# Patient Record
Sex: MALE | Race: WHITE | NOT HISPANIC OR LATINO | Employment: OTHER | URBAN - METROPOLITAN AREA
[De-identification: names, ages, dates, MRNs, and addresses within clinical notes are randomized per-mention and may not be internally consistent; named-entity substitution may affect disease eponyms.]

---

## 2023-08-07 ENCOUNTER — HOSPITAL ENCOUNTER (OUTPATIENT)
Dept: DATA CONVERSION | Facility: HOSPITAL | Age: 58
Discharge: ANOTHER HOSPITAL | End: 2023-08-07
Attending: EMERGENCY MEDICINE

## 2023-08-07 DIAGNOSIS — Z79.01 LONG TERM (CURRENT) USE OF ANTICOAGULANTS: ICD-10-CM

## 2023-08-07 DIAGNOSIS — S11.95XA OPEN BITE OF UNSPECIFIED PART OF NECK, INITIAL ENCOUNTER: ICD-10-CM
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
## 2023-08-07 DIAGNOSIS — Z86.718 PERSONAL HISTORY OF OTHER VENOUS THROMBOSIS AND EMBOLISM: ICD-10-CM

## 2023-08-07 DIAGNOSIS — Z79.899 OTHER LONG TERM (CURRENT) DRUG THERAPY: ICD-10-CM

## 2023-08-07 DIAGNOSIS — Z23 ENCOUNTER FOR IMMUNIZATION: ICD-10-CM

## 2023-08-07 DIAGNOSIS — Z20.822 CONTACT WITH AND (SUSPECTED) EXPOSURE TO COVID-19: ICD-10-CM

## 2023-08-07 LAB
ABO + RH BLD: NORMAL
AMPHETAMINES UR QL SCN>1000 NG/ML: NEGATIVE
ANION GAP SERPL CALCULATED.3IONS-SCNC: 12 MMOL/L (ref 0–19)
ANTICOAGULANT: ABNORMAL
APAP SERPL-MCNC: 5 UG/ML (ref 15–30)
BARBITURATES UR QL SCN>300 NG/ML: NEGATIVE
BASOPHILS # BLD AUTO: 0.11 K/UL (ref 0–0.22)
BASOPHILS NFR BLD AUTO: 1.5 % (ref 0–1)
BENZODIAZ UR QL SCN>300 NG/ML: NEGATIVE
BLD GP AB SCN SERPL QL: NEGATIVE
BLD PROD TYP BPU: NORMAL
BUN SERPL-MCNC: 11 MG/DL (ref 8–25)
BUN/CREAT SERPL: 11 RATIO (ref 8–21)
BZE UR QL SCN>300 NG/ML: NEGATIVE
CALCIUM SERPL-MCNC: 8.8 MG/DL (ref 8.5–10.4)
CANNABINOIDS UR QL SCN>50 NG/ML: NORMAL
CHLORIDE SERPL-SCNC: 104 MMOL/L (ref 97–107)
CO2 SERPL-SCNC: 22 MMOL/L (ref 24–31)
CREAT SERPL-MCNC: 1 MG/DL (ref 0.4–1.6)
DEPRECATED RDW RBC AUTO: 47.7 FL (ref 37–54)
DIFFERENTIAL METHOD BLD: ABNORMAL
DRUG SCREEN COMMENT UR-IMP: NORMAL
EOSINOPHIL # BLD AUTO: 0.15 K/UL (ref 0–0.45)
EOSINOPHIL NFR BLD: 2 % (ref 0–3)
ERYTHROCYTE [DISTWIDTH] IN BLOOD BY AUTOMATED COUNT: 14 % (ref 11.7–15)
ETHANOL SERPL-MCNC: <0.01 GM/DL (ref 0–0.01)
EUA DISCLAIMER: NORMAL
FENTANYL+NORFENTANYL UR QL SCN: NORMAL
FLUAV RNA NPH QL NAA+PROBE: NEGATIVE
FLUBV RNA NPH QL NAA+PROBE: NEGATIVE
GFR SERPL CREATININE-BSD FRML MDRD: 87 ML/MIN/1.73 M2
GLUCOSE SERPL-MCNC: 129 MG/DL (ref 65–99)
HCT VFR BLD AUTO: 41.5 % (ref 41–50)
HGB BLD-MCNC: 14.4 GM/DL (ref 13.5–16.5)
HX OF BLOOD TRANSFUSION: NORMAL
IMM GRANULOCYTES # BLD AUTO: 0.01 K/UL (ref 0–0.1)
INR PPP: 2.5 (ref 0.86–1.16)
LACTATE BLDV-SCNC: 1.7 MMOL/L (ref 0.4–2)
LITHIUM SERPL-SCNC: 0.1 MMOL/L (ref 0.6–1.2)
LYMPHOCYTES # BLD AUTO: 2.33 K/UL (ref 1.2–3.2)
LYMPHOCYTES NFR BLD MANUAL: 31.1 % (ref 20–40)
MCH RBC QN AUTO: 32.5 PG (ref 26–34)
MCHC RBC AUTO-ENTMCNC: 34.7 % (ref 31–37)
MCV RBC AUTO: 93.7 FL (ref 80–100)
METHADONE UR QL SCN>300 NG/ML: NEGATIVE
MONOCYTES # BLD AUTO: 0.64 K/UL (ref 0–0.8)
MONOCYTES NFR BLD MANUAL: 8.6 % (ref 0–8)
NEUTROPHILS # BLD AUTO: 4.24 K/UL
NEUTROPHILS # BLD AUTO: 4.24 K/UL (ref 1.8–7.7)
NEUTROPHILS.IMMATURE NFR BLD: 0.1 % (ref 0–1)
NEUTS SEG NFR BLD: 56.7 % (ref 50–70)
NRBC BLD-RTO: 0 /100 WBC
NUM BPU REQUESTED: 0
OPIATES UR QL SCN>300 NG/ML: NEGATIVE
OSMOL GAP SERPL: 293 MOS/KG (ref 285–295)
OXYCODONE UR QL: NEGATIVE
PCP UR QL SCN>25 NG/ML: NEGATIVE
PLATELET # BLD AUTO: 297 K/UL (ref 150–450)
PMV BLD AUTO: 10.8 CU (ref 7–12.6)
POTASSIUM SERPL-SCNC: 3.7 MMOL/L (ref 3.4–5.1)
PROTHROMBIN TIME: 25.1 SEC (ref 9.3–12.7)
RBC # BLD AUTO: 4.43 M/UL (ref 4.5–5.5)
SALICYLATES SERPL-MCNC: 0.3 MG/DL (ref 3–25)
SARS-COV-2 RNA SPEC QL NAA+PROBE: NEGATIVE
SODIUM SERPL-SCNC: 138 MMOL/L (ref 133–145)
SPECIMEN EXP DATE BLD: NORMAL
TEST ORDERED: NORMAL
TRANSF BAND NUM PATIENT: NORMAL
WBC # BLD AUTO: 7.5 K/UL (ref 4.5–11)

## 2023-08-08 ENCOUNTER — HOSPITAL ENCOUNTER (OUTPATIENT)
Dept: DATA CONVERSION | Facility: HOSPITAL | Age: 58
Setting detail: OBSERVATION
Discharge: HOME | End: 2023-08-11
Attending: EMERGENCY MEDICINE | Admitting: EMERGENCY MEDICINE
Payer: MEDICARE

## 2023-08-08 DIAGNOSIS — Z95.828 PRESENCE OF OTHER VASCULAR IMPLANTS AND GRAFTS: ICD-10-CM

## 2023-08-08 DIAGNOSIS — S11.90XA UNSPECIFIED OPEN WOUND OF UNSPECIFIED PART OF NECK, INITIAL ENCOUNTER: ICD-10-CM

## 2023-08-08 DIAGNOSIS — Z86.718 PERSONAL HISTORY OF OTHER VENOUS THROMBOSIS AND EMBOLISM: ICD-10-CM

## 2023-08-08 DIAGNOSIS — D69.3 IMMUNE THROMBOCYTOPENIC PURPURA (MULTI): ICD-10-CM

## 2023-08-08 DIAGNOSIS — D58.9 HEREDITARY HEMOLYTIC ANEMIA, UNSPECIFIED (CMS-HCC): ICD-10-CM

## 2023-08-08 DIAGNOSIS — Z79.01 LONG TERM (CURRENT) USE OF ANTICOAGULANTS: ICD-10-CM

## 2023-08-08 DIAGNOSIS — S11.95XA OPEN BITE OF UNSPECIFIED PART OF NECK, INITIAL ENCOUNTER: ICD-10-CM
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
## 2023-08-08 DIAGNOSIS — D69.41: ICD-10-CM

## 2023-08-08 DIAGNOSIS — D59.12 COLD AUTOIMMUNE HEMOLYTIC ANEMIA (MULTI): ICD-10-CM

## 2023-08-09 LAB
ACTIVATED PARTIAL THROMBOPLASTIN TIME IN PPP BY COAGULATION ASSAY: 35 SEC (ref 27–38)
ACTIVATED PARTIAL THROMBOPLASTIN TIME IN PPP BY COAGULATION ASSAY: 42 SEC (ref 27–38)
ALANINE AMINOTRANSFERASE (SGPT) (U/L) IN SER/PLAS: 9 U/L (ref 10–52)
ALBUMIN (G/DL) IN SER/PLAS: 4.1 G/DL (ref 3.4–5)
ALKALINE PHOSPHATASE (U/L) IN SER/PLAS: 65 U/L (ref 33–120)
ANION GAP IN SER/PLAS: 14 MMOL/L (ref 10–20)
ASPARTATE AMINOTRANSFERASE (SGOT) (U/L) IN SER/PLAS: 18 U/L (ref 9–39)
BASOPHILS (10*3/UL) IN BLOOD BY AUTOMATED COUNT: 0.07 X10E9/L (ref 0–0.1)
BASOPHILS/100 LEUKOCYTES IN BLOOD BY AUTOMATED COUNT: 0.5 % (ref 0–2)
BILIRUBIN TOTAL (MG/DL) IN SER/PLAS: 0.8 MG/DL (ref 0–1.2)
CALCIUM (MG/DL) IN SER/PLAS: 9.1 MG/DL (ref 8.6–10.6)
CARBON DIOXIDE, TOTAL (MMOL/L) IN SER/PLAS: 25 MMOL/L (ref 21–32)
CHLORIDE (MMOL/L) IN SER/PLAS: 105 MMOL/L (ref 98–107)
CREATININE (MG/DL) IN SER/PLAS: 1.16 MG/DL (ref 0.5–1.3)
EOSINOPHILS (10*3/UL) IN BLOOD BY AUTOMATED COUNT: 0.27 X10E9/L (ref 0–0.7)
EOSINOPHILS/100 LEUKOCYTES IN BLOOD BY AUTOMATED COUNT: 2 % (ref 0–6)
ERYTHROCYTE DISTRIBUTION WIDTH (RATIO) BY AUTOMATED COUNT: 14.4 % (ref 11.5–14.5)
ERYTHROCYTE MEAN CORPUSCULAR HEMOGLOBIN CONCENTRATION (G/DL) BY AUTOMATED: 34.6 G/DL (ref 32–36)
ERYTHROCYTE MEAN CORPUSCULAR VOLUME (FL) BY AUTOMATED COUNT: 95 FL (ref 80–100)
ERYTHROCYTES (10*6/UL) IN BLOOD BY AUTOMATED COUNT: 3.91 X10E12/L (ref 4.5–5.9)
GFR MALE: 72 ML/MIN/1.73M2
GLUCOSE (MG/DL) IN SER/PLAS: 94 MG/DL (ref 74–99)
HEMATOCRIT (%) IN BLOOD BY AUTOMATED COUNT: 37 % (ref 41–52)
HEMOGLOBIN (G/DL) IN BLOOD: 12.8 G/DL (ref 13.5–17.5)
IMMATURE GRANULOCYTES/100 LEUKOCYTES IN BLOOD BY AUTOMATED COUNT: 0.4 % (ref 0–0.9)
INR IN PPP BY COAGULATION ASSAY: 1.9 (ref 0.9–1.1)
INR IN PPP BY COAGULATION ASSAY: 2.5 (ref 0.9–1.1)
LEUKOCYTES (10*3/UL) IN BLOOD BY AUTOMATED COUNT: 13.8 X10E9/L (ref 4.4–11.3)
LYMPHOCYTES (10*3/UL) IN BLOOD BY AUTOMATED COUNT: 2.03 X10E9/L (ref 1.2–4.8)
LYMPHOCYTES/100 LEUKOCYTES IN BLOOD BY AUTOMATED COUNT: 14.7 % (ref 13–44)
MONOCYTES (10*3/UL) IN BLOOD BY AUTOMATED COUNT: 1.3 X10E9/L (ref 0.1–1)
MONOCYTES/100 LEUKOCYTES IN BLOOD BY AUTOMATED COUNT: 9.4 % (ref 2–10)
NEUTROPHILS (10*3/UL) IN BLOOD BY AUTOMATED COUNT: 10.04 X10E9/L (ref 1.2–7.7)
NEUTROPHILS/100 LEUKOCYTES IN BLOOD BY AUTOMATED COUNT: 73 % (ref 40–80)
NRBC (PER 100 WBCS) BY AUTOMATED COUNT: 0 /100 WBC (ref 0–0)
PLATELETS (10*3/UL) IN BLOOD AUTOMATED COUNT: 289 X10E9/L (ref 150–450)
POTASSIUM (MMOL/L) IN SER/PLAS: 5.2 MMOL/L (ref 3.5–5.3)
PROTEIN TOTAL: 6.1 G/DL (ref 6.4–8.2)
PROTHROMBIN TIME (PT) IN PPP BY COAGULATION ASSAY: 21.7 SEC (ref 9.8–12.8)
PROTHROMBIN TIME (PT) IN PPP BY COAGULATION ASSAY: 27.9 SEC (ref 9.8–12.8)
SODIUM (MMOL/L) IN SER/PLAS: 139 MMOL/L (ref 136–145)
UREA NITROGEN (MG/DL) IN SER/PLAS: 13 MG/DL (ref 6–23)

## 2023-08-10 LAB
ABO GROUP (TYPE) IN BLOOD: NORMAL
ACTIVATED PARTIAL THROMBOPLASTIN TIME IN PPP BY COAGULATION ASSAY: 30 SEC (ref 27–38)
ANTIBODY SCREEN: NORMAL
INR IN PPP BY COAGULATION ASSAY: 1.5 (ref 0.9–1.1)
PROTHROMBIN TIME (PT) IN PPP BY COAGULATION ASSAY: 17 SEC (ref 9.8–12.8)
RH FACTOR: NORMAL

## 2023-09-15 PROBLEM — B58.01: Status: ACTIVE | Noted: 2023-09-15

## 2023-09-15 PROBLEM — N52.9 ED (ERECTILE DYSFUNCTION): Status: ACTIVE | Noted: 2023-09-15

## 2023-09-15 PROBLEM — B02.30 HERPES ZOSTER WITH OPHTHALMIC COMPLICATION: Status: ACTIVE | Noted: 2023-09-15

## 2023-09-15 PROBLEM — F41.8 DEPRESSION WITH ANXIETY: Status: ACTIVE | Noted: 2023-09-15

## 2023-09-15 PROBLEM — B02.9 SHINGLES: Status: ACTIVE | Noted: 2023-09-15

## 2023-09-15 PROBLEM — I42.9 CARDIOMYOPATHY (MULTI): Status: ACTIVE | Noted: 2023-09-15

## 2023-09-15 PROBLEM — I82.90 VENOUS THROMBOSIS: Status: ACTIVE | Noted: 2023-09-15

## 2023-09-15 PROBLEM — H25.13 NUCLEAR SCLEROSIS OF BOTH EYES: Status: ACTIVE | Noted: 2023-09-15

## 2023-09-15 PROBLEM — R53.83 FATIGUE: Status: ACTIVE | Noted: 2023-09-15

## 2023-09-15 PROBLEM — G47.01 INSOMNIA DUE TO MEDICAL CONDITION: Status: ACTIVE | Noted: 2023-09-15

## 2023-09-15 PROBLEM — R94.30 CARDIAC LV EJECTION FRACTION 21-40%: Status: ACTIVE | Noted: 2023-09-15

## 2023-09-15 PROBLEM — R93.1 CARDIAC LV EJECTION FRACTION 21-40%: Status: ACTIVE | Noted: 2023-09-15

## 2023-09-15 PROBLEM — H53.8 BLURRED VISION, RIGHT EYE: Status: ACTIVE | Noted: 2023-09-15

## 2023-09-15 PROBLEM — M47.816 LUMBAR SPONDYLOSIS: Status: ACTIVE | Noted: 2023-09-15

## 2023-09-15 PROBLEM — N13.8 BPH WITH OBSTRUCTION/LOWER URINARY TRACT SYMPTOMS: Status: ACTIVE | Noted: 2023-09-15

## 2023-09-15 PROBLEM — M79.7 FIBROMYALGIA: Status: ACTIVE | Noted: 2023-09-15

## 2023-09-15 PROBLEM — H53.10 DISTURBANCE, VISUAL, SUBJECTIVE: Status: ACTIVE | Noted: 2023-09-15

## 2023-09-15 PROBLEM — H43.89 VITRITIS OF RIGHT EYE: Status: ACTIVE | Noted: 2023-09-15

## 2023-09-15 PROBLEM — N40.1 BPH WITH OBSTRUCTION/LOWER URINARY TRACT SYMPTOMS: Status: ACTIVE | Noted: 2023-09-15

## 2023-09-15 PROBLEM — E87.8 FLUID VOLUME DISORDER: Status: ACTIVE | Noted: 2023-09-15

## 2023-09-15 PROBLEM — E55.9 VITAMIN D INSUFFICIENCY: Status: ACTIVE | Noted: 2023-09-15

## 2023-09-15 PROBLEM — H90.3 SENSORINEURAL HEARING LOSS OF BOTH EARS: Status: ACTIVE | Noted: 2023-09-15

## 2023-09-15 PROBLEM — H25.13 AGE-RELATED NUCLEAR CATARACT OF BOTH EYES: Status: ACTIVE | Noted: 2023-09-15

## 2023-09-15 PROBLEM — R20.2 PARESTHESIA OF SKIN: Status: ACTIVE | Noted: 2023-09-15

## 2023-09-15 PROBLEM — H30.91: Status: ACTIVE | Noted: 2023-09-15

## 2023-09-15 PROBLEM — E87.6 HYPOKALEMIA: Status: ACTIVE | Noted: 2023-09-15

## 2023-09-15 PROBLEM — B37.89: Status: ACTIVE | Noted: 2023-09-15

## 2023-09-15 PROBLEM — H93.13 TINNITUS OF BOTH EARS: Status: ACTIVE | Noted: 2023-09-15

## 2023-09-15 PROBLEM — D69.41: Status: ACTIVE | Noted: 2023-09-15

## 2023-09-15 PROBLEM — H90.12 CONDUCTIVE HEARING LOSS OF LEFT EAR: Status: ACTIVE | Noted: 2023-09-15

## 2023-09-15 PROBLEM — H10.45 CHRONIC ALLERGIC CONJUNCTIVITIS: Status: ACTIVE | Noted: 2023-09-15

## 2023-09-15 PROBLEM — H40.051 OCULAR HYPERTENSION OF RIGHT EYE: Status: ACTIVE | Noted: 2023-09-15

## 2023-09-15 PROBLEM — J30.9 ALLERGIC RHINITIS: Status: ACTIVE | Noted: 2023-09-15

## 2023-09-15 PROBLEM — H43.11 VITREOUS HEMORRHAGE, RIGHT EYE (MULTI): Status: ACTIVE | Noted: 2023-09-15

## 2023-09-15 RX ORDER — VORICONAZOLE 200 MG/1
1 TABLET, FILM COATED ORAL EVERY 12 HOURS
COMMUNITY
Start: 2022-01-11

## 2023-09-15 RX ORDER — LORAZEPAM 1 MG/1
TABLET ORAL
COMMUNITY
Start: 2017-09-21

## 2023-09-15 RX ORDER — TIMOLOL MALEATE 5 MG/ML
1 SOLUTION/ DROPS OPHTHALMIC 2 TIMES DAILY
COMMUNITY

## 2023-09-15 RX ORDER — CITALOPRAM 40 MG/1
1 TABLET, FILM COATED ORAL DAILY
COMMUNITY
Start: 2021-12-14

## 2023-09-15 RX ORDER — PANTOPRAZOLE SODIUM 40 MG/1
1 TABLET, DELAYED RELEASE ORAL DAILY
COMMUNITY
Start: 2021-12-14

## 2023-09-15 RX ORDER — OMEPRAZOLE 20 MG/1
1 CAPSULE, DELAYED RELEASE ORAL EVERY 24 HOURS
COMMUNITY

## 2023-09-15 RX ORDER — OXYCODONE HYDROCHLORIDE 5 MG/1
TABLET ORAL
COMMUNITY
Start: 2021-12-14

## 2023-09-15 RX ORDER — RITUXIMAB 10 MG/ML
INJECTION, SOLUTION INTRAVENOUS
COMMUNITY
Start: 2019-01-22 | End: 2023-11-25 | Stop reason: WASHOUT

## 2023-09-15 RX ORDER — CITALOPRAM 20 MG/1
40 TABLET, FILM COATED ORAL DAILY
COMMUNITY

## 2023-09-15 RX ORDER — MINERAL OIL
1 ENEMA (ML) RECTAL
COMMUNITY
Start: 2021-02-10

## 2023-09-15 RX ORDER — PREGABALIN 100 MG/1
CAPSULE ORAL
COMMUNITY

## 2023-09-15 RX ORDER — PREDNISOLONE ACETATE 10 MG/ML
SUSPENSION/ DROPS OPHTHALMIC
COMMUNITY
Start: 2022-01-11 | End: 2023-11-28 | Stop reason: SDUPTHER

## 2023-09-15 RX ORDER — TRAMADOL HYDROCHLORIDE 50 MG/1
TABLET ORAL
COMMUNITY

## 2023-09-15 RX ORDER — VALACYCLOVIR HYDROCHLORIDE 1 G/1
1 TABLET, FILM COATED ORAL 3 TIMES DAILY
COMMUNITY
Start: 2022-01-11

## 2023-09-15 RX ORDER — SULFAMETHOXAZOLE AND TRIMETHOPRIM 800; 160 MG/1; MG/1
1 TABLET ORAL 2 TIMES DAILY
COMMUNITY
Start: 2022-01-11

## 2023-09-15 RX ORDER — CHLORHEXIDINE GLUCONATE ORAL RINSE 1.2 MG/ML
SOLUTION DENTAL
COMMUNITY
Start: 2021-10-06

## 2023-09-15 RX ORDER — PREDNISONE 5 MG/1
TABLET ORAL
COMMUNITY
Start: 2021-12-14

## 2023-09-15 RX ORDER — TOPIRAMATE 25 MG/1
TABLET ORAL
COMMUNITY
End: 2023-11-25 | Stop reason: ALTCHOICE

## 2023-09-15 RX ORDER — WARFARIN 10 MG/1
20 TABLET ORAL DAILY
COMMUNITY

## 2023-09-15 RX ORDER — TEMAZEPAM 30 MG/1
CAPSULE ORAL
COMMUNITY

## 2023-09-15 RX ORDER — WARFARIN 7.5 MG/1
TABLET ORAL
COMMUNITY
Start: 2021-12-14

## 2023-09-29 VITALS
RESPIRATION RATE: 16 BRPM | TEMPERATURE: 97.5 F | BODY MASS INDEX: 21.67 KG/M2 | HEIGHT: 65 IN | DIASTOLIC BLOOD PRESSURE: 84 MMHG | OXYGEN SATURATION: 98 % | SYSTOLIC BLOOD PRESSURE: 146 MMHG | HEART RATE: 66 BPM | WEIGHT: 130.07 LBS

## 2023-09-30 NOTE — H&P
"    History of Present Illness:   HPI:    DONNA WHITTAKER is a 57 year old Male with a PMHx of Flores syndrome, acute ITP, BLE DVTs (for which he has an IVC filter and is on warfarin 10mg daily), and hemolytic  anemia who presents to the ED s/p dog bite to the right side of the neck on the evening of 8/7.  Patient was seen as a trauma consult and this ED yesterday and was discharged home after hemostasis of the wound was achieved with silver nitrate cauterization,  several loose sutures, and Surgicel packing.  He presents again with continued bleeding, swelling, and pain to the right side of the neck.  Patient states that he did not take his warfarin today. No other complaints at this time.     Per HPI from the evening of 8/7/23:   Pt arrives \"...as a trauma consult s/p dog bite to the right side of the neck.  Per patient, his dogs were agitated at the sound of fire trucks outside, so the patient got between them to calm them down, at which  time his 200lb English Mastiff jumped up and bit the right side of his neck.  Patient reports that he did not think it was a large wound until he saw the amount of blood loss.  Patient stepped outside and showed the firemen, at which time he was transported  to [facility].  Patient denies falling, dizziness, difficulty breathing, difficulty swallowing, numbness, nor any further injuries.  He does note a throbbing pain to the right side of the neck.  There is a 0.5 cm puncture wound to the right cheek  as well, where the patient states that the dog's tooth hit.  Patient reports that his dogs are all up-to-date on vaccinations.  At the OSH, patient received a tetanus booster, 3g amp/sulbactam, and 2g cefazolin.  CTA neck was performed which did not show  any vascular injury.  Hgb was 14.4, WBC 7.5, INR 2.5 (goal 2.5-3.0). Patient was transferred for further wound evaluation and treatment including possible surgical washout.  Upon arrival, patient complains continues to " "complain of right-sided neck pain,  but denies any other symptoms.  Wound with mild oozing, otherwise hemostatic.\"    PMHx: Flores syndrome, acute ITP, cold autoimmune hemolytic anemia, BLE DVT (with IVC filter and warfarin), anxiety, MDD    PSHx: Splenectomy, cholecystectomy, fistula repair    FHx: Noncontributory    Allergies: Heparin (rash)    Medications: Warfarin 10 mg daily, otherwise per EMR    Mechanism of Injury:  ·  Mechanism of Injury penetrating     Method of Arrival:  ·  Method of Arrival private vehicle     Primary Survey:   Airway Assessment:  ·  Airway intact     Breathing:  ·  Breathing equal bilateral breath sounds   ·  Breathing Left Side clear   ·  Breathing Right Side clear     Pulses:  ·  Radial Pulse Left 2+ (normal)   ·  Radial Pulse Right 2+ (normal)     Disability:  ·  Disability awake, neurologically intact     Corbin T Coma Scale:    Corbin T Coma Scale:   Best Eye Response: (E4) spontaneous   Best Motor Response: (M6) obeys commands   Best Verbal Response: (V5) oriented   Columbia Score: 15     Secondary Survey:   Physical Exam Narrative:  ·  Physical Exam:    Constitutional: Well developed, awake/alert/oriented  x3, no distress, alert and cooperative   Eyes: PERRL, EOMI, clear sclera, wearing  glasses   ENMT: mucous membranes moist   Head/Neck: NC; 0.5cm puncture wound to right  cheek with small area of underlying edema which is tender to touch; 4x3cm irregular avulsion injury to right side of neck with 2x2cm tissue flap which is sutured loosely in place, mild oozing of blood from superior aspect, no exposed muscle nor vessels,  mild underlying edema which is improved from yesterday, very tender to touch   Respiratory/Thorax: Patent airways, CTAB,  normal breath sounds with good chest expansion, thorax symmetric, no stridor   Cardiovascular: RRR, warm   Gastrointestinal: Nondistended, soft, non-tender   Musculoskeletal: ROM intact, no joint swelling,  normal strength   Extremities: " normal extremities, no cyanosis  edema, contusions or wounds, no clubbing   Neurological: alert and oriented x3, intact  senses, motor, response and reflexes, normal strength   Psychological: Appropriate mood and behaviour   Skin: Warm and dry, see head/neck           Social History:   ·  Smoking Status never smoker (1)   ·  Alcohol Use denies(1)   ·  Drug Use denies (1)       Allergies:       Allergies:  ·  heparin : Rash      Home Medications:  * Patient Currently Takes Medications as of 08-Aug-2023 09:53 documented in Structured Notes  ·   amoxicillin-clavulanate 875 mg-125 mg oral tablet: Schedule: 0, 1 tab(s) orally 2 times a day , Status: Active  ·  LORazepam  1 mg oral tablet: Schedule: 4, 1 tab(s) orally every 12 hours, As needed, Anxiety, Status: Active  ·  warfarin 7.5  mg oral tablet: Schedule: 0, 1 tab(s) orally  - (Every 1  week at ,18:00 on Tuesday, Monday), Status: Active  ·  pantoprazole  40 mg oral delayed release tablet: Schedule: 0, 1 tab(s) orally once a day, Status: Active  ·  citalopram  40 mg oral tablet: Schedule: 0, 1 tab(s) orally once a day, Status: Active    Review of Systems:   NOTE : A COMPREHENSIVE REVIEW OF SYSTEMS REQUIRES 10 OR MORE SYSTEMS BE REVIEWED AND DOCUMENTED  Constitutional: NEGATIVE: Fever, Chills     Eyes: NEGATIVE: Blurry Vision, Vision Loss/ Change     ENMT: NEGATIVE: Nasal Congestion, Ear Pain, Mouth  Pain, Throat Pain; COMMENTS: +neck pain     Respiratory: NEGATIVE: Dry Cough, Shortness of  Breath     Cardiac: NEGATIVE: Chest Pain, Palpitations, Syncope     Gastrointestinal: NEGATIVE: Nausea, Vomiting     Genitourinary: NEGATIVE: Flank Pain     Musculoskeletal: POSITIVE: Pain, Swelling     Neurological: NEGATIVE: Headache     Psychiatric: NEGATIVE: Anxiety     Skin: POSITIVE: Mass, Pain     Endocrine: NEGATIVE: Sweat     Hematologic/Lymph: NEGATIVE: Petechiae     All Other Systems: All other systems reviewed and  are negative     Objective:     ·  Objective  Information        T   P  R  BP   MAP  SpO2   Value  36.4  70  16  137/82      99%  Date/Time 8/8 23:16 8/9 1:53 8/9 1:53 8/9 1:53    8/9 1:53  Range  (36.4C - 36.4C )  (66 - 70 )  (16 - 16 )  (137 - 146 )/ (82 - 84 )    (98% - 99% )    Recent Lab Results:    Recent Lab Results:   Results:        I have reviewed these laboratory results:    Complete Blood Count + Differential  09-Aug-2023 00:08:00      Result Value    White Blood Cell Count  13.8   H   Nucleated Erythrocyte Count  0.0    Red Blood Cell Count  3.91   L   HGB  12.8   L   HCT  37.0   L   MCV  95    MCHC  34.6    PLT  289    RDW-CV  14.4    Neutrophil %  73.0    Immature Granulocytes %  0.4    Lymphocyte %  14.7    Monocyte %  9.4    Eosinophil %  2.0    Basophil %  0.5    Neutrophil Count  10.04   H   Lymphocyte Count  2.03    Monocyte Count  1.30   H   Eosinophil Count  0.27    Basophil Count  0.07      Coagulation Screen  09-Aug-2023 00:08:00      Result Value    Prothrombin Time, Plasma  27.9   H   International Normalized Ratio, Plasma  2.5   H   Activated Partial Thromboplastin Time  42   H     Comprehensive Metabolic Panel  09-Aug-2023 00:08:00      Result Value    Glucose, Serum  94    NA  139    K  5.2    CL  105    Bicarbonate, Serum  25    Anion Gap, Serum  14    BUN  13    CREAT  1.16    GFR Male  72    Calcium, Serum  9.1    ALB  4.1    ALKP  65    T Pro  6.1   L   T Bili  0.8    Alanine Aminotransferase, Serum  9   L   Aspartate Transaminase, Serum  18          Assessment and Plan:  ·  Assessment and Plan    56 y/o M s/p dog bite to right side of neck    List of injuries/problems:  -Dog bite to right side of neck  -PMHx of Flores syndrome, acute ITP, cold autoimmune hemolytic anemia, BLE DVT (with IVC filter and warfarin), anxiety, MDD    Plan:   #Dog bite  -No indication for repeat CTA at this time  -No indication for warfarin reversal at this time given minimal oozing  -Hgb unchanged from yesterday  -INR 2.5 from 3.0 with goal  "range=2.5-3.0  -Currently holding home warfarin  -Continue oral amp/sulbactam   -S/p tetanus booster and washout  -Pain control with aceteminophen and oxycodone 2.5mg PRN    Dispo: CDU full at this time. Trauma will admit under observation.    Seen and discussed with Dr. Fountain.     Keena Villanueva PA-C  Trauma Surgery, [id number]  [facility]      Attestation:   Note Completion:  I am a:  Advanced Practice Provider   Attending Only - Shared Visit with Advanced Practice Provider This is a shared visit.  I have reviewed the Advanced Practice Provider?s encounter note, approve the Advanced Practice Provider?s documentation,  and provide the following additional information from my personal encounter.    Comments/ Additional Findings    agree          Electronic Signatures:  Linda Fountain (DO)  (Signed 09-Aug-2023 06:49)   Authored: Note Completion   Co-Signer: History of Present Illness, Primary Survey, Secondary Survey, Patient History, Allergies, Medications, Review of Systems, Objective,  Assessment and Plan, Note Completion  Keena Villanueva (PAC)  (Signed 09-Aug-2023 04:14)   Authored: History of Present Illness, Primary Survey,  Secondary Survey, Patient History, Allergies, Medications, Review of Systems, Objective, Assessment and Plan, Note Completion      Last Updated: 09-Aug-2023 06:49 by Linda Fountain (DO)    References:  1.  Data Referenced From \"Risk Screen - Adult Emergency\" 07-Aug-2023 22:55   "

## 2023-09-30 NOTE — H&P
History & Physical Reviewed:   I have reviewed the History and Physical dated:  10-Aug-2023   History and Physical reviewed and relevant findings noted. Patient examined to review pertinent physical  findings.: No significant changes   Home Medications Reviewed: no changes noted   Allergies Reviewed: no changes noted       ERAS (Enhanced Recovery After Surgery):  ·  ERAS Patient: no     Consent:   COVID-19 Consent:  ·  COVID-19 Risk Consent Surgeon has reviewed key risks related to the risk of yola COVID-19 and if they contract COVID-19 what the risks are.     Attestation:   Note Completion:  I am a:  Resident/Fellow   Attending Attestation I saw and evaluated the patient.  I personally obtained the key and critical portions of the history and physical exam or was physically present for key and  critical portions performed by the resident/fellow. I reviewed the resident/fellow?s documentation and discussed the patient with the resident/fellow.  I agree with the resident/fellow?s medical decision making as documented in the note.     I personally evaluated the patient on 10-Aug-2023         Electronic Signatures:  Bradly Ospina (DMD (Resident))  (Signed 10-Aug-2023 07:19)   Authored: History & Physical Reviewed, ERAS, Consent,  Note Completion  Iwona Gutiérrez)  (Signed 10-Aug-2023 11:17)   Authored: Note Completion   Co-Signer: History & Physical Reviewed, ERAS, Consent, Note Completion      Last Updated: 10-Aug-2023 11:17 by Iwona Gutiérrez)

## 2023-09-30 NOTE — DISCHARGE SUMMARY
Send Summary:   Discharge Summary Providers:  Provider Role Provider Name   · Referring Correct Info, Needed   · Primary Ju Ngo   · Attending Nic Mayfield       Note Recipients: Correct Info, Needed, Jose Solis MD WILLIAMS, CHLOE       Discharge:    Summary:   Admission Date: .08-Aug-2023 23:09:00   Discharge Date: 11-Aug-2023   Attending Physician at Discharge: Iwona Gutiérrez   Admission Reason: dog bite (1)   Final Discharge Diagnoses: Dog bite of neck   Procedures: Date: 10-Aug-2023 17:52:00  Procedure Name: 1. exploration of wound   2. hematoma evacuation   3. wound debridement   4.complex wound closure   Condition at Discharge: Satisfactory   Disposition at Discharge: .Home   Vital Signs:        T   P  R  BP   MAP  SpO2   Value  36.7  62  18  127/75      96%  Date/Time 8/11 8:28 8/11 8:28 8/11 0:00 8/11 8:28    8/11 8:28  Range  (36.2C - 36.7C )  (62 - 70 )  (18 - 18 )  (102 - 127 )/ (64 - 76 )    (95% - 97% )    Date:            Weight/Scale Type:  Height:   09-Aug-2023 04:18  59  kg / bed  165.1  cm  Physical Exam:    Constitutional: Well developed, awake/alert/oriented  x3, no distress, alert and cooperative   Eyes: EOMI, clear sclera   ENMT: mucous membranes moist   Head/Neck: Neck supple, trachea midline,  wound dressing on the right side of the neck   Respiratory/Thorax: non labored breathing  RA   Cardiovascular: no edema on hands or feet   Psychological: Appropriate mood and behavior   Skin: Warm and dry     Hospital Course:    DONNA WHITTAKER is a 57 year old Male with a PMHx of Flores syndrome, acute ITP, BLE DVTs (for which he has an IVC filter and is on warfarin 10mg daily), and hemolytic  anemia who presents  initially on 8/7/23 as a trauma consult s/p dog bite to the right side of the neck. Patient was given Tetanus booster and the wound was washed and closed bedside, patient was discharged from the ED.   on 8/8/23 patient returned back to the ED with continued  bleeding, swelling and pain on the right side of the neck, patient was admitted to the [facility] floor, hematology  consulted for recommendation for bleeding management before taking the patient to  the OR for wound exploration, evacuation of hematoma, wound debridement and complex wound closure    Immunizations:    Immunizations:  18-Dec-2018   .Influenza- Influenza Virus: Immunizations, 18-Dec-2018      Discharge Information:    and Continuing Care:   Lab Results - Pending:    None  Radiology Results - Pending: None   Discharge Instructions:    Activity:           activity as tolerated.          May shower..  in 24  hours, avoid direct stream of water to the          May return to school/work.            May drive..      Nutrition/Diet:           resume normal diet    Wound Care:           Wound Site:   right neck          Wound Type:   surgical incision          Change Dressing:   daily          Apply:   Bacitracin          Instructions:   no lotions, creams, or tub soaks    Additional Orders:           Additional Instructions:   Post-Operative Instructions      Please read your instructions carefully and call with any questions.    FOLLOW UP You will be seen within 1 week after surgery.     Position When sleeping  elevate your head and back with several pillows for the first 1-2 days after surgery. Lie on your back, rather than on your sides or stomach.      Activity (I)  During your first post-operative day, you should sit, stand, or walk around rather than remain in bed.  However, you should rest when tired.      Sun Exposure Protect your facial skin from excessive sun exposure as long as the treated area(s) are still pink.  When the treated area(s) are no longer pink, ordinary exposure is not harmful, but a sunscreen should always be used.    Medications some medications may not be available in liquid form; please obtain a ?pill ? that can help to crush needed medications and can be taken with  liquids/soft foods.     Pain Reliever:  take one tablet when you arrive at home.  Additional tablets may be taken every 4-6 hours as needed for pain relief.  CAUTION:  Do not drive or operate machinery while taking narcotic pain relievers.  Take with food or liquid to avoid  nausea.   Antibiotics:  continuo with your home antibiotic 1 tab every 12 hours for the next 3 days                 Anticoagulation: restart your warfarin medication as scheduled by your primary care and use the new anticoagulant Fondaparinux 7.5mg daily injection until you achieve the target INR of (2.5-3) then you can stop the Fondaparinux and continuo  on daily warfarin alone        PLEASE REPORT ANY OF THE FOLLOWING:    A. Sudden or excessive bleeding, swelling, or bruising.  B. Any itching, rash or reaction to medications.  C. Fever, temperature over 101 degrees (taken orally).  D. Discharge from the incision (other than blood).      Follow Up Appointments:    Follow-Up Appointment 01:           Physician/Dept/Service:   Trauma Surgery          Reason for Referral:   follow up s/p dog bite on the right neck          Call to Schedule in:   1 week          Phone Number:   [phone]    Discharge Medications: Home Medication   citalopram 40 mg oral tablet - 1 tab(s) orally once a day  pantoprazole 40 mg oral delayed release tablet - 1 tab(s) orally once a day  warfarin 7.5 mg oral tablet - 1 tab(s) orally  - (Every 1  week at ,18:00 on Tuesday, Monday)  fondaparinux 7.5 mg/0.6 mL subcutaneous solution - 0.6  subcutaneously once a day until target INR  is achieved   rosuvastatin 10 mg oral tablet - 1 tab(s) orally once a day  carvedilol 3.125 mg oral tablet - 1 tab(s) orally 2 times a day  amoxicillin-clavulanate 875 mg-125 mg oral tablet - 1 tab(s) orally every 12 hours  for 3 days   acetaminophen 325 mg oral tablet - 2 tab(s) orally every 4 hours     PRN Medication   oxyCODONE 5 mg oral tablet - 1 tab(s) orally every 6 hours, As Needed  "-Pain - Severe (7-10)   LORazepam 1 mg oral tablet - 1 tab(s) orally every 12 hours, As needed, Anxiety     DNR Status:   ·  Code Status Code Status order at time of discharge: Full Code     Attestation:   Note Completion:  I am a:  Resident/Fellow   Attending Attestation I saw and evaluated the patient.  I personally obtained the key and critical portions of the history and physical exam or was physically present for key and  critical portions performed by the resident/fellow. I reviewed the resident/fellow?s documentation and discussed the patient with the resident/fellow.  I agree with the resident/fellow?s medical decision making as documented in their note  with the exception/addition of the following:    I personally evaluated the patient on 11-Aug-2023   Comments/ Additional Findings    D/c with 7 days augmentin (from date of injury)  Elevate HOB to reduce swelling  Dressing to come off POD2          Electronic Signatures:  Bradly Ospina (DMD (Resident))  (Signed 11-Aug-2023 13:09)   Authored: Send Summary, Summary Content, Immunizations,  Ongoing Care, DNR Status, Note Completion  Iwona Gutiérrez)  (Signed 11-Aug-2023 17:11)   Authored: Summary Content, Note Completion   Co-Signer: Send Summary, Summary Content, Immunizations, Ongoing Care, DNR Status, Note Completion      Last Updated: 11-Aug-2023 17:11 by Iwona Gutiérrez)    References:  1.  Data Referenced From \"Consult-Hematology\" 09-Aug-2023 10:52   "

## 2023-09-30 NOTE — PROGRESS NOTES
Service: Trauma Surgery     Subjective Data:   DONNA WHITTAKER is a 57 year old Male who is Hospital Day # 3 and POD #0 for 1. hematoma evacuation ;2. wound debridement ;3. complex wound closure ;4. ;5.    Overnight Events: Patient had an uneventful night.     Objective Data:     Objective Information:      T   P  R  BP   MAP  SpO2   Value  36.3  63  18  119/76      97%  Date/Time 8/10 7:53 8/10 7:53 8/10 7:53 8/10 7:53    8/10 7:53  Range  (35.8C - 36.7C )  (63 - 79 )  (16 - 18 )  (102 - 137 )/ (69 - 82 )    (94% - 99% )      Pain reported at 8/10 18:30: sleeping    Physical Exam by System:    Constitutional: Well developed, awake/alert/oriented  x3, no distress, alert and cooperative   Eyes: EOMI, clear sclera   ENMT: mucous membranes moist   Head/Neck: Neck supple, trachea midline, wound dressing  on the right side of the neck   Respiratory/Thorax: non labored breathing RA   Cardiovascular: no edema on hands or feet   Psychological: Appropriate mood and behavior   Skin: Warm and dry     Recent Lab Results:    Results:    Coagulation: 8/10/2023 07:44  PT  /                    17.0 H /  -------<    INR          ----------<      1.5 H  PTT\                    30  \                       Assessment and Plan:   Daily Risk Screen:  ·  Does patient have an indwelling urinary catheter? n/a consulting service   ·  Does patient have a central line? n/a consulting service     Code Status:  ·  Code Status Full Code     Assessment:    57 y o m s/p evacuation of hematoma on the right side of the neck, wound debridement and complex wound closure.    Plan:    F: regular diet    A: Acetaminophen 650 q4h, oxy 2.5/5 q4h as needed for pain    V: no fluid indicated    R: RA      IS    I: Ancef 2gm q8h for 24 hrs    C: continuo home carvidelol 3.125 once daily      continuo home rousavastatin 10 mg daily    GI: -PPI px: continuo home pantoprazol 40mg daily       -senna , miralax    DVT ppx: fondaparinux 2.5mg  q24hr      Dispo: home        Felisha Ospina SUSANNE  Oral and Maxillofacial Surgery resident  Trauma Surgery team  Available on [facility]        Attestation:   Note Completion:  I am a:  Resident/Fellow   Attending Attestation I saw and evaluated the patient.  I personally obtained the key and critical portions of the history and physical exam or was physically present for key and  critical portions performed by the resident/fellow. I reviewed the resident/fellow?s documentation and discussed the patient with the resident/fellow.  I agree with the resident/fellow?s medical decision making as documented in the note.     I personally evaluated the patient on 10-Aug-2023         Electronic Signatures:  Bradly Ospina (DMD (Resident))  (Signed 10-Aug-2023 18:52)   Authored: Service, Subjective Data, Objective Data, Assessment  and Plan, Note Completion  Iwona Gutiérrez)  (Signed 11-Aug-2023 16:45)   Authored: Note Completion   Co-Signer: Service, Subjective Data, Objective Data, Assessment and Plan, Note Completion      Last Updated: 11-Aug-2023 16:45 by Iwona Gutiérrez)

## 2023-10-01 NOTE — OP NOTE
Post Operative Note:     PreOp Diagnosis: hematoma right side of the neck   Post-Procedure Diagnosis: hematoma right side of  the neck   Procedure: 1. exploration of wound   2. hematoma evacuation   3. wound debridement   4.complex wound closure   Surgeon: Iwona Gutiérrez   Resident/Fellow/Other Assistant: Bradly Ospina   Anesthesia: General   Estimated Blood Loss (mL): 10   Blood Replacement: none   Specimen: no   Complications: none   Findings: subcutaneous hematoma on the right upper  anterior neck   Patient Returned To/Condition: PACU   Urine Output: none   Drains and/or Catheters: none   Tourniquet Times: none   Implants: n/a   Additional Details: Plan:  -return to the trauma floor  -ANCEF post operatively  -elevate head of bed  -pain management   -DVT ppx  -Diet: regular     Operative Report Dictated:  Dictation: not applicable - note contains Operative  Report   Operative Report:       INDICATIONS FOR PROCEDURE: Cooper Tam is a 57 year old male who presented to [facility] with a dog bite to his right neck.  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  Local wound care was provided  in the emergency department, and he was discharged.  However he returned after having developed hematoma and with concerns about his wound. We had a long discussion with regard to the rationale for surgery in this setting.  We also discussed potential  complications of bleeding, infection, injury to surrounding structures, along with need for further procedures. Patient understood and wished to proceed with the above procedure.     DESCRIPTION OF PROCEDURE:  Patient was identified preoperatively by two identifiers. They were brought to the operating room and placed on the table in supine position. A standard surgical briefing was performed. General anesthesia was induced and an LMA was placed. Patient was  secured in position and all pressure points were padded.   He received a preoperative dose of Ancef.  Skin was prepped using Betadine and draped in the usual sterile fashion. A timeout was performed.    A superficial hematoma was evacuated.  It was about 5 cc total.  The wound measured 7 x 2 cm but had very ragged edges with some necrosis.  Everything was above the platysma muscle.  We excised the skin in an elliptical fashion to make fresh healthy skin  edges.  The wound was overall hemostatic, but he did have diffuse oozing due to his baseline coagulopathy and the inflammation from the subacute state of the wound.  We copiously irrigated the wound with saline.  Hemostasis was achieved with cautery.   Surgicel powder was placed in to the wound bed.  The wound was able to approximate primarily.  We closed the deep layers with interrupted 2-0 Vicryl suture.  We placed interrupted 3-0 Vicryl deep dermal sutures.  Skin was closed with a series of interrupted  3-0 Prolene stitches.  Final wound measured 10 cm in length.  It was covered with a strip of Xeroform and a sterile gauze dressing.    Sponge and instrument counts were correct x2. The patient was awakened from general anesthesia and taken to the PACU in good condition. I was present and scrubbed for the entire procedure.        Attestation:   Note Completion:  I am a: Resident/Fellow   Attending Attestation I was present for the entire procedure          Electronic Signatures:  Bradly Ospina (DMD (Resident))  (Signed 10-Aug-2023 18:33)   Authored: Post Operative Note, Note Completion  Iwona Gutiérrez)  (Signed 11-Aug-2023 16:43)   Authored: Post Operative Note, Note Completion   Co-Signer: Post Operative Note, Note Completion      Last Updated: 11-Aug-2023 16:43 by Iwona Gutiérrez)

## 2023-11-08 ENCOUNTER — PATIENT OUTREACH (OUTPATIENT)
Dept: HEMATOLOGY/ONCOLOGY | Facility: HOSPITAL | Age: 58
End: 2023-11-08
Payer: MEDICARE

## 2023-11-08 NOTE — PROGRESS NOTES
RN talked to patient. 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 The patient said that his hemoglobin will drop and that he has had blood clots in both legs. Patient aware of date, time and place. Patient can call for any problems or concerns. Call back instructions reviewed.

## 2023-11-13 ENCOUNTER — APPOINTMENT (OUTPATIENT)
Dept: HEMATOLOGY/ONCOLOGY | Facility: CLINIC | Age: 58
End: 2023-11-13

## 2023-11-16 ENCOUNTER — OFFICE VISIT (OUTPATIENT)
Dept: HEMATOLOGY/ONCOLOGY | Facility: HOSPITAL | Age: 58
End: 2023-11-16
Payer: MEDICARE

## 2023-11-16 ENCOUNTER — LAB (OUTPATIENT)
Dept: LAB | Facility: HOSPITAL | Age: 58
End: 2023-11-16
Payer: MEDICARE

## 2023-11-16 VITALS
DIASTOLIC BLOOD PRESSURE: 65 MMHG | HEART RATE: 63 BPM | SYSTOLIC BLOOD PRESSURE: 131 MMHG | WEIGHT: 142 LBS | TEMPERATURE: 97.7 F | OXYGEN SATURATION: 100 % | BODY MASS INDEX: 23.66 KG/M2 | HEIGHT: 65 IN | RESPIRATION RATE: 16 BRPM

## 2023-11-16 DIAGNOSIS — D69.3 IMMUNE THROMBOCYTOPENIC PURPURA (MULTI): ICD-10-CM

## 2023-11-16 DIAGNOSIS — F32.A DEPRESSION, UNSPECIFIED DEPRESSION TYPE: ICD-10-CM

## 2023-11-16 DIAGNOSIS — D59.11 WARM AUTOIMMUNE HEMOLYTIC ANEMIA (MULTI): ICD-10-CM

## 2023-11-16 DIAGNOSIS — I82.403 RECURRENT DEEP VEIN THROMBOSIS (DVT) OF BOTH LOWER EXTREMITIES (MULTI): ICD-10-CM

## 2023-11-16 DIAGNOSIS — D59.12 COLD AGGLUTININ DISEASE (MULTI): Primary | ICD-10-CM

## 2023-11-16 DIAGNOSIS — D59.12 COLD AGGLUTININ DISEASE (MULTI): ICD-10-CM

## 2023-11-16 DIAGNOSIS — D69.41 EVAN'S SYNDROME (MULTI): ICD-10-CM

## 2023-11-16 DIAGNOSIS — Z86.2 PERSONAL HISTORY OF DISEASES OF THE BLOOD AND BLOOD-FORMING ORGANS AND CERTAIN DISORDERS INVOLVING THE IMMUNE MECHANISM: ICD-10-CM

## 2023-11-16 LAB
ACANTHOCYTES BLD QL SMEAR: NORMAL
ALBUMIN SERPL BCP-MCNC: 4.6 G/DL (ref 3.4–5)
ALP SERPL-CCNC: 64 U/L (ref 33–136)
ALT SERPL W P-5'-P-CCNC: 13 U/L (ref 10–52)
ANION GAP SERPL CALC-SCNC: 11 MMOL/L (ref 10–20)
APTT PPP: 45 SECONDS (ref 27–38)
AST SERPL W P-5'-P-CCNC: 14 U/L (ref 9–39)
B2 MICROGLOB SERPL-MCNC: 1.7 MG/L (ref 0.7–2.2)
BASOPHILS # BLD AUTO: 0.1 X10*3/UL (ref 0–0.1)
BASOPHILS NFR BLD AUTO: 1.5 %
BILIRUB SERPL-MCNC: 0.4 MG/DL (ref 0–1.2)
BUN SERPL-MCNC: 11 MG/DL (ref 6–23)
BURR CELLS BLD QL SMEAR: NORMAL
CALCIUM SERPL-MCNC: 9.4 MG/DL (ref 8.6–10.3)
CENTROMERE B AB SER-ACNC: <0.2 AI
CHLORIDE SERPL-SCNC: 105 MMOL/L (ref 98–107)
CHROMATIN AB SERPL-ACNC: <0.2 AI
CO2 SERPL-SCNC: 29 MMOL/L (ref 21–32)
CREAT SERPL-MCNC: 0.84 MG/DL (ref 0.5–1.3)
DAT-POLYSPECIFIC: NORMAL
DSDNA AB SER-ACNC: <1 IU/ML
ENA JO1 AB SER QL IA: <0.2 AI
ENA RNP AB SER IA-ACNC: <0.2 AI
ENA SCL70 AB SER QL IA: <0.2 AI
ENA SM AB SER IA-ACNC: <0.2 AI
ENA SM+RNP AB SER QL IA: <0.2 AI
ENA SS-A AB SER IA-ACNC: <0.2 AI
ENA SS-B AB SER IA-ACNC: <0.2 AI
EOSINOPHIL # BLD AUTO: 0.15 X10*3/UL (ref 0–0.7)
EOSINOPHIL NFR BLD AUTO: 2.2 %
ERYTHROCYTE [DISTWIDTH] IN BLOOD BY AUTOMATED COUNT: 14.1 % (ref 11.5–14.5)
FERRITIN SERPL-MCNC: 784 NG/ML (ref 20–300)
FOLATE SERPL-MCNC: 11.3 NG/ML
GFR SERPL CREATININE-BSD FRML MDRD: >90 ML/MIN/1.73M*2
GLUCOSE SERPL-MCNC: 88 MG/DL (ref 74–99)
HCT VFR BLD AUTO: 38.6 % (ref 41–52)
HGB BLD-MCNC: 13.5 G/DL (ref 13.5–17.5)
HGB RETIC QN: 35 PG (ref 28–38)
IMM GRANULOCYTES # BLD AUTO: 0.02 X10*3/UL (ref 0–0.7)
IMM GRANULOCYTES NFR BLD AUTO: 0.3 % (ref 0–0.9)
IMMATURE RETIC FRACTION: 6.4 %
INR PPP: 3.1 (ref 0.9–1.1)
IRON SATN MFR SERPL: 24 % (ref 25–45)
IRON SERPL-MCNC: 72 UG/DL (ref 35–150)
LDH SERPL L TO P-CCNC: 147 U/L (ref 84–246)
LYMPHOCYTES # BLD AUTO: 1.84 X10*3/UL (ref 1.2–4.8)
LYMPHOCYTES NFR BLD AUTO: 27.5 %
MCH RBC QN AUTO: 33.2 PG (ref 26–34)
MCHC RBC AUTO-ENTMCNC: 35 G/DL (ref 32–36)
MCV RBC AUTO: 95 FL (ref 80–100)
MONOCYTES # BLD AUTO: 0.7 X10*3/UL (ref 0.1–1)
MONOCYTES NFR BLD AUTO: 10.5 %
NEUTROPHILS # BLD AUTO: 3.87 X10*3/UL (ref 1.2–7.7)
NEUTROPHILS NFR BLD AUTO: 58 %
NRBC BLD-RTO: 0 /100 WBCS (ref 0–0)
PLATELET # BLD AUTO: 284 X10*3/UL (ref 150–450)
POTASSIUM SERPL-SCNC: 4.1 MMOL/L (ref 3.5–5.3)
PROT SERPL-MCNC: 6.6 G/DL (ref 6.4–8.2)
PROT SERPL-MCNC: 6.6 G/DL (ref 6.4–8.2)
PROT UR-ACNC: 18 MG/DL (ref 5–25)
PROTHROMBIN TIME: 35.8 SECONDS (ref 9.8–12.8)
RBC # BLD AUTO: 4.07 X10*6/UL (ref 4.5–5.9)
RBC MORPH BLD: NORMAL
RETICS #: 0.06 X10*6/UL (ref 0.02–0.12)
RETICS/RBC NFR AUTO: 1.5 % (ref 0.5–2)
RIBOSOMAL P AB SER-ACNC: <0.2 AI
SODIUM SERPL-SCNC: 141 MMOL/L (ref 136–145)
TARGETS BLD QL SMEAR: NORMAL
TIBC SERPL-MCNC: 294 UG/DL (ref 240–445)
UIBC SERPL-MCNC: 222 UG/DL (ref 110–370)
VIT B12 SERPL-MCNC: 402 PG/ML (ref 211–911)
WBC # BLD AUTO: 6.7 X10*3/UL (ref 4.4–11.3)

## 2023-11-16 PROCEDURE — 88189 FLOWCYTOMETRY/READ 16 & >: CPT | Performed by: PATHOLOGY

## 2023-11-16 PROCEDURE — 83010 ASSAY OF HAPTOGLOBIN QUANT: CPT

## 2023-11-16 PROCEDURE — 86334 IMMUNOFIX E-PHORESIS SERUM: CPT

## 2023-11-16 PROCEDURE — 99215 OFFICE O/P EST HI 40 MIN: CPT | Performed by: STUDENT IN AN ORGANIZED HEALTH CARE EDUCATION/TRAINING PROGRAM

## 2023-11-16 PROCEDURE — 82232 ASSAY OF BETA-2 PROTEIN: CPT

## 2023-11-16 PROCEDURE — 82728 ASSAY OF FERRITIN: CPT

## 2023-11-16 PROCEDURE — 84165 PROTEIN E-PHORESIS SERUM: CPT | Performed by: PATHOLOGY

## 2023-11-16 PROCEDURE — 1036F TOBACCO NON-USER: CPT | Performed by: STUDENT IN AN ORGANIZED HEALTH CARE EDUCATION/TRAINING PROGRAM

## 2023-11-16 PROCEDURE — 88185 FLOWCYTOMETRY/TC ADD-ON: CPT | Mod: TC

## 2023-11-16 PROCEDURE — 85730 THROMBOPLASTIN TIME PARTIAL: CPT

## 2023-11-16 PROCEDURE — 85610 PROTHROMBIN TIME: CPT

## 2023-11-16 PROCEDURE — 85045 AUTOMATED RETICULOCYTE COUNT: CPT

## 2023-11-16 PROCEDURE — 83615 LACTATE (LD) (LDH) ENZYME: CPT

## 2023-11-16 PROCEDURE — 82746 ASSAY OF FOLIC ACID SERUM: CPT

## 2023-11-16 PROCEDURE — 36415 COLL VENOUS BLD VENIPUNCTURE: CPT

## 2023-11-16 PROCEDURE — 86235 NUCLEAR ANTIGEN ANTIBODY: CPT

## 2023-11-16 PROCEDURE — 86320 SERUM IMMUNOELECTROPHORESIS: CPT | Performed by: PATHOLOGY

## 2023-11-16 PROCEDURE — 99417 PROLNG OP E/M EACH 15 MIN: CPT | Performed by: STUDENT IN AN ORGANIZED HEALTH CARE EDUCATION/TRAINING PROGRAM

## 2023-11-16 PROCEDURE — 83521 IG LIGHT CHAINS FREE EACH: CPT

## 2023-11-16 PROCEDURE — 84156 ASSAY OF PROTEIN URINE: CPT

## 2023-11-16 PROCEDURE — 83550 IRON BINDING TEST: CPT

## 2023-11-16 PROCEDURE — 84166 PROTEIN E-PHORESIS/URINE/CSF: CPT | Performed by: PATHOLOGY

## 2023-11-16 PROCEDURE — 84166 PROTEIN E-PHORESIS/URINE/CSF: CPT

## 2023-11-16 PROCEDURE — 84155 ASSAY OF PROTEIN SERUM: CPT | Mod: 59

## 2023-11-16 PROCEDURE — 86038 ANTINUCLEAR ANTIBODIES: CPT

## 2023-11-16 PROCEDURE — 86880 COOMBS TEST DIRECT: CPT

## 2023-11-16 PROCEDURE — 85025 COMPLETE CBC W/AUTO DIFF WBC: CPT

## 2023-11-16 PROCEDURE — 84075 ASSAY ALKALINE PHOSPHATASE: CPT

## 2023-11-16 PROCEDURE — 82607 VITAMIN B-12: CPT

## 2023-11-16 PROCEDURE — 99205 OFFICE O/P NEW HI 60 MIN: CPT | Performed by: STUDENT IN AN ORGANIZED HEALTH CARE EDUCATION/TRAINING PROGRAM

## 2023-11-16 PROCEDURE — 84155 ASSAY OF PROTEIN SERUM: CPT

## 2023-11-16 RX ORDER — CARVEDILOL 3.12 MG/1
TABLET ORAL
COMMUNITY

## 2023-11-16 RX ORDER — TAMSULOSIN HYDROCHLORIDE 0.4 MG/1
0.4 CAPSULE ORAL DAILY
COMMUNITY
End: 2024-01-10 | Stop reason: SDUPTHER

## 2023-11-16 RX ORDER — ROSUVASTATIN CALCIUM 10 MG/1
10 TABLET, COATED ORAL DAILY
COMMUNITY

## 2023-11-16 RX ORDER — LISINOPRIL 5 MG/1
5 TABLET ORAL DAILY
COMMUNITY

## 2023-11-16 ASSESSMENT — ENCOUNTER SYMPTOMS
OCCASIONAL FEELINGS OF UNSTEADINESS: 0
LOSS OF SENSATION IN FEET: 0
DEPRESSION: 0

## 2023-11-16 ASSESSMENT — PATIENT HEALTH QUESTIONNAIRE - PHQ9
1. LITTLE INTEREST OR PLEASURE IN DOING THINGS: NOT AT ALL
10. IF YOU CHECKED OFF ANY PROBLEMS, HOW DIFFICULT HAVE THESE PROBLEMS MADE IT FOR YOU TO DO YOUR WORK, TAKE CARE OF THINGS AT HOME, OR GET ALONG WITH OTHER PEOPLE: NOT DIFFICULT AT ALL
SUM OF ALL RESPONSES TO PHQ9 QUESTIONS 1 AND 2: 2
2. FEELING DOWN, DEPRESSED OR HOPELESS: MORE THAN HALF THE DAYS

## 2023-11-16 ASSESSMENT — COLUMBIA-SUICIDE SEVERITY RATING SCALE - C-SSRS
2. HAVE YOU ACTUALLY HAD ANY THOUGHTS OF KILLING YOURSELF?: NO
1. IN THE PAST MONTH, HAVE YOU WISHED YOU WERE DEAD OR WISHED YOU COULD GO TO SLEEP AND NOT WAKE UP?: NO
6. HAVE YOU EVER DONE ANYTHING, STARTED TO DO ANYTHING, OR PREPARED TO DO ANYTHING TO END YOUR LIFE?: NO

## 2023-11-16 ASSESSMENT — PAIN SCALES - GENERAL: PAINLEVEL: 0-NO PAIN
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
## 2023-11-17 LAB
ANA SER QL HEP2 SUBST: NEGATIVE
HAPTOGLOB SERPL-MCNC: 141 MG/DL (ref 37–246)
KAPPA LC SERPL-MCNC: 2.07 MG/DL (ref 0.33–1.94)
KAPPA LC/LAMBDA SER: 0.87 {RATIO} (ref 0.26–1.65)
LAMBDA LC SERPL-MCNC: 2.37 MG/DL (ref 0.57–2.63)

## 2023-11-19 LAB
ALBUMIN MFR UR ELPH: 27.6 %
ALPHA1 GLOB MFR UR ELPH: 13.5 %
ALPHA2 GLOB MFR UR ELPH: 15.8 %
B-GLOBULIN MFR UR ELPH: 29 %
GAMMA GLOB MFR UR ELPH: 14.1 %
PATH REVIEW-URINE PROTEIN ELECTROPHORESIS: NORMAL
URINE ELECTROPHORESIS COMMENT: NORMAL

## 2023-11-20 LAB
ALBUMIN: 4.4 G/DL (ref 3.4–5)
ALPHA 1 GLOBULIN: 0.3 G/DL (ref 0.2–0.6)
ALPHA 2 GLOBULIN: 0.6 G/DL (ref 0.4–1.1)
BETA GLOBULIN: 0.7 G/DL (ref 0.5–1.2)
GAMMA GLOBULIN: 0.6 G/DL (ref 0.5–1.4)
IMMUNOFIXATION COMMENT: NORMAL
PATH REVIEW - SERUM IMMUNOFIXATION: NORMAL
PATH REVIEW-SERUM PROTEIN ELECTROPHORESIS: NORMAL
PROTEIN ELECTROPHORESIS COMMENT: NORMAL

## 2023-11-21 ENCOUNTER — APPOINTMENT (OUTPATIENT)
Dept: OPHTHALMOLOGY | Facility: CLINIC | Age: 58
End: 2023-11-21
Payer: MEDICARE

## 2023-11-22 PROCEDURE — 89240 UNLISTED MISC PATH TEST: CPT | Performed by: FAMILY MEDICINE

## 2023-11-23 LAB
CELL COUNT (BLOOD): 6.7 X10*3/UL
CELL POPULATIONS: NORMAL
DIAGNOSIS: NORMAL
FLOW DIFFERENTIAL: NORMAL
FLOW TEST ORDERED: NORMAL
LAB TEST METHOD: NORMAL
NUMBER OF CELLS COLLECTED: NORMAL PER TUBE
PATH REPORT.TOTAL CANCER: NORMAL
SIGNATURE COMMENT: NORMAL
SPECIMEN VIABILITY: NORMAL

## 2023-11-25 PROBLEM — D59.11: Status: ACTIVE | Noted: 2023-11-25

## 2023-11-25 PROBLEM — D69.3 IMMUNE THROMBOCYTOPENIC PURPURA (MULTI): Status: ACTIVE | Noted: 2023-11-25

## 2023-11-25 PROBLEM — D59.12 COLD AGGLUTININ DISEASE (MULTI): Status: ACTIVE | Noted: 2023-11-25

## 2023-11-25 RX ORDER — HEPARIN SODIUM,PORCINE/PF 10 UNIT/ML
50 SYRINGE (ML) INTRAVENOUS AS NEEDED
Status: CANCELLED | OUTPATIENT
Start: 2023-11-25

## 2023-11-25 RX ORDER — EPINEPHRINE 0.3 MG/.3ML
0.3 INJECTION SUBCUTANEOUS EVERY 5 MIN PRN
Status: CANCELLED | OUTPATIENT
Start: 2023-12-13

## 2023-11-25 RX ORDER — PROCHLORPERAZINE EDISYLATE 5 MG/ML
10 INJECTION INTRAMUSCULAR; INTRAVENOUS EVERY 6 HOURS PRN
Status: CANCELLED | OUTPATIENT
Start: 2023-12-13

## 2023-11-25 RX ORDER — ACETAMINOPHEN 325 MG/1
650 TABLET ORAL ONCE
Status: CANCELLED | OUTPATIENT
Start: 2023-12-13

## 2023-11-25 RX ORDER — ALBUTEROL SULFATE 0.83 MG/ML
3 SOLUTION RESPIRATORY (INHALATION) AS NEEDED
Status: CANCELLED | OUTPATIENT
Start: 2023-12-13

## 2023-11-25 RX ORDER — HEPARIN 100 UNIT/ML
500 SYRINGE INTRAVENOUS AS NEEDED
Status: CANCELLED | OUTPATIENT
Start: 2023-11-25

## 2023-11-25 RX ORDER — FAMOTIDINE 10 MG/ML
20 INJECTION INTRAVENOUS ONCE AS NEEDED
Status: CANCELLED | OUTPATIENT
Start: 2023-12-13

## 2023-11-25 RX ORDER — DIPHENHYDRAMINE HYDROCHLORIDE 50 MG/ML
50 INJECTION INTRAMUSCULAR; INTRAVENOUS AS NEEDED
Status: CANCELLED | OUTPATIENT
Start: 2023-12-13

## 2023-11-25 RX ORDER — DIPHENHYDRAMINE HCL 50 MG
50 CAPSULE ORAL ONCE
Status: CANCELLED | OUTPATIENT
Start: 2023-12-13

## 2023-11-25 RX ORDER — PROCHLORPERAZINE MALEATE 10 MG
10 TABLET ORAL EVERY 6 HOURS PRN
Status: CANCELLED | OUTPATIENT
Start: 2023-12-13

## 2023-11-27 ENCOUNTER — TELEPHONE (OUTPATIENT)
Dept: HEMATOLOGY/ONCOLOGY | Facility: HOSPITAL | Age: 58
End: 2023-11-27
Payer: MEDICARE

## 2023-11-27 NOTE — TELEPHONE ENCOUNTER
----- Message from Thanh Merritt MD sent at 11/25/2023 11:25 PM EST -----  Hi, can you please let him know his results indicate he is in remission, however I would like to complete the maintenance rituximab for 1 more dose. Which have ordered for mid December. I would like to see him back in 3 months. Thanks

## 2023-11-28 ENCOUNTER — APPOINTMENT (OUTPATIENT)
Dept: LAB | Facility: LAB | Age: 58
End: 2023-11-28
Payer: MEDICARE

## 2023-11-28 ENCOUNTER — OFFICE VISIT (OUTPATIENT)
Dept: OPHTHALMOLOGY | Facility: CLINIC | Age: 58
End: 2023-11-28
Payer: MEDICARE

## 2023-11-28 DIAGNOSIS — B37.89 CANDIDA ENDOPHTHALMITIS: Primary | ICD-10-CM

## 2023-11-28 DIAGNOSIS — B58.01 TOXOPLASMA CHORIORETINITIS, RIGHT: ICD-10-CM

## 2023-11-28 DIAGNOSIS — H35.371 EPIRETINAL MEMBRANE (ERM) OF RIGHT EYE: ICD-10-CM

## 2023-11-28 PROCEDURE — 92134 CPTRZ OPH DX IMG PST SGM RTA: CPT | Mod: BILATERAL PROCEDURE

## 2023-11-28 PROCEDURE — 99213 OFFICE O/P EST LOW 20 MIN: CPT

## 2023-11-28 RX ORDER — PREDNISOLONE ACETATE 10 MG/ML
SUSPENSION/ DROPS OPHTHALMIC
Qty: 5 ML | Refills: 1 | Status: SHIPPED | OUTPATIENT
Start: 2023-11-28

## 2023-11-28 ASSESSMENT — ENCOUNTER SYMPTOMS
CARDIOVASCULAR NEGATIVE: 0
GASTROINTESTINAL NEGATIVE: 0
HEMATOLOGIC/LYMPHATIC NEGATIVE: 0
RESPIRATORY NEGATIVE: 0
CONSTITUTIONAL NEGATIVE: 0
NEUROLOGICAL NEGATIVE: 0
MUSCULOSKELETAL NEGATIVE: 0
EYES NEGATIVE: 0
PSYCHIATRIC NEGATIVE: 0
ENDOCRINE NEGATIVE: 0
ALLERGIC/IMMUNOLOGIC NEGATIVE: 0

## 2023-11-28 ASSESSMENT — TONOMETRY
OS_IOP_MMHG: 14
IOP_METHOD: GOLDMANN APPLANATION
OD_IOP_MMHG: 14

## 2023-11-28 ASSESSMENT — SLIT LAMP EXAM - LIDS
COMMENTS: NORMAL
COMMENTS: NORMAL

## 2023-11-28 ASSESSMENT — CONF VISUAL FIELD
OS_NORMAL: 1
OS_INFERIOR_TEMPORAL_RESTRICTION: 0
OS_SUPERIOR_NASAL_RESTRICTION: 0
OD_INFERIOR_NASAL_RESTRICTION: 0
OD_INFERIOR_TEMPORAL_RESTRICTION: 0
OD_SUPERIOR_TEMPORAL_RESTRICTION: 0
OS_SUPERIOR_TEMPORAL_RESTRICTION: 0
OD_NORMAL: 1
OD_SUPERIOR_NASAL_RESTRICTION: 0
OS_INFERIOR_NASAL_RESTRICTION: 0

## 2023-11-28 ASSESSMENT — CUP TO DISC RATIO
OS_RATIO: 0.25
OD_RATIO: 0.25

## 2023-11-28 ASSESSMENT — EXTERNAL EXAM - LEFT EYE: OS_EXAM: NORMAL

## 2023-11-28 ASSESSMENT — EXTERNAL EXAM - RIGHT EYE: OD_EXAM: NORMAL

## 2023-11-28 ASSESSMENT — VISUAL ACUITY
METHOD: SNELLEN - LINEAR
CORRECTION_TYPE: GLASSES
OD_CC: 20/20
OS_CC: 20/20

## 2023-11-28 NOTE — PROGRESS NOTES
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 No IRF or SRF, retinal thinning inferotemporally   -----OS: WNL; Normal foveal contour, RPE and outer retinal layers. No IRF or SRF     Impression:    1. ERM (2ry to uveitis) - slightly worse however, patient is not noticing any distortion or visual change      Observe for now     2. No active signs of inflammation - continue prednisolone 1x/day  - will plan to f/u in 1 month, sooner PRN       Combined form of age-related cataract, right eye~H25.811   Combined form of age-related cataract, left eye~H25.812   -Not visually significant at this time. Monitor.    [address] [employer]        NAME: Matilda Lee is a 13 y o  female  : 2007    MRN: [id number]  DATE: January 15, 2019  TIME: 10:26 AM    Assessment and Plan   Flu-like symptoms [R68 89]  1  Flu-like symptoms     2  Bilateral otitis media, unspecified otitis media type  amoxicillin (AMOXIL) 500 mg capsule    predniSONE 10 mg tablet       Denied flu swab and tamiflu; likely flu   There is a b/l ear infection; begin antibiotics as directed      Patient Instructions       Follow up with PCP in 3-5 days  Proceed to  ER if symptoms worsen  Chief Complaint     Chief Complaint   Patient presents with    Cold Like Symptoms     C/O harsh cough, tight in the chest, body aches, since last PM  Pt did not have the flu vaccine  History of Present Illness       13year-old female presents with cough, chest tightness, body aches with fever since yesterday  Patient's mother states she did not receive a flu vaccine this year  Patient was given Tylenol for the fever  Fever is 100 2 in the clinic  She states the body started this morning  He states the harsh cough and chest tightness is with bothering her the most   There is no history of asthma  Denies shortness of breath  Review of Systems   Review of Systems   Constitutional: Negative for chills, fatigue and fever  HENT: Positive for congestion  Negative for ear pain, sinus pain, sore throat and trouble swallowing  Eyes: Negative for pain, discharge and redness  Respiratory: Positive for cough and chest tightness  Negative for shortness of breath and wheezing  Cardiovascular: Negative for chest pain, palpitations and leg swelling  Gastrointestinal: Negative for abdominal pain, diarrhea, nausea and vomiting  Musculoskeletal: Negative for arthralgias, joint swelling and myalgias  Skin: Negative for rash  Neurological: Negative for dizziness, weakness, numbness and headaches           Current Medications       Current Outpatient Prescriptions:     amoxicillin (AMOXIL) 500 mg capsule, Take 1 capsule (500 mg total) by mouth every 8 (eight) hours for 10 days, Disp: 30 capsule, Rfl: 0    predniSONE 10 mg tablet, Take 4 tablets daily for 5 days, Disp: 20 tablet, Rfl: 0    Current Allergies     Allergies as of 01/15/2019    (No Known Allergies)            The following portions of the patient's history were reviewed and updated as appropriate: allergies, current medications, past family history, past medical history, past social history, past surgical history and problem list      History reviewed  No pertinent past medical history  History reviewed  No pertinent surgical history  No family history on file  Medications have been verified  Objective   BP (!) 124/84   Pulse (!) 118   Temp (!) 100 2 °F (37 9 °C) (Tympanic)   Resp (!) 20   Wt 57 1 kg (125 lb 12 8 oz)   LMP 12/26/2018   SpO2 98%        Physical Exam     Physical Exam   Constitutional: She is oriented to person, place, and time  She appears well-developed and well-nourished  She appears ill  No distress  HENT:   Head: Normocephalic  Right Ear: External ear normal  A middle ear effusion is present  Left Ear: External ear normal  A middle ear effusion is present  Nose: Nose normal    Mouth/Throat: Uvula is midline and mucous membranes are normal  Posterior oropharyngeal erythema present  Eyes: Pupils are equal, round, and reactive to light  Conjunctivae and EOM are normal    Neck: Normal range of motion  Neck supple  Cardiovascular: Normal rate, regular rhythm and normal heart sounds  No murmur heard  Pulmonary/Chest: Effort normal and breath sounds normal  No respiratory distress  She has no wheezes  Abdominal: Soft  Bowel sounds are normal  There is no tenderness  Musculoskeletal: Normal range of motion  Lymphadenopathy:     She has no cervical adenopathy  Neurological: She is alert and oriented to person, place, and time   She has normal reflexes  Skin: Skin is warm and dry  Psychiatric: She has a normal mood and affect  Nursing note and vitals reviewed

## 2023-11-29 LAB — SCAN RESULT: NORMAL

## 2023-12-01 ENCOUNTER — SOCIAL WORK (OUTPATIENT)
Dept: HEMATOLOGY/ONCOLOGY | Facility: HOSPITAL | Age: 58
End: 2023-12-01
Payer: MEDICARE
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
## 2023-12-13 ENCOUNTER — INFUSION (OUTPATIENT)
Dept: HEMATOLOGY/ONCOLOGY | Facility: HOSPITAL | Age: 58
End: 2023-12-13
Payer: MEDICARE

## 2023-12-13 VITALS
BODY MASS INDEX: 23.51 KG/M2 | DIASTOLIC BLOOD PRESSURE: 58 MMHG | OXYGEN SATURATION: 97 % | WEIGHT: 141.09 LBS | SYSTOLIC BLOOD PRESSURE: 104 MMHG | RESPIRATION RATE: 16 BRPM | TEMPERATURE: 98.2 F | HEIGHT: 65 IN | HEART RATE: 67 BPM

## 2023-12-13 DIAGNOSIS — D59.12 COLD AGGLUTININ DISEASE (MULTI): ICD-10-CM

## 2023-12-13 DIAGNOSIS — D69.41 EVAN'S SYNDROME (MULTI): ICD-10-CM

## 2023-12-13 DIAGNOSIS — D59.11 WARM AUTOIMMUNE HEMOLYTIC ANEMIA (MULTI): ICD-10-CM

## 2023-12-13 DIAGNOSIS — D69.3 IMMUNE THROMBOCYTOPENIC PURPURA (MULTI): ICD-10-CM

## 2023-12-13 LAB
ALBUMIN SERPL BCP-MCNC: 4.3 G/DL (ref 3.4–5)
ALP SERPL-CCNC: 58 U/L (ref 33–136)
ALT SERPL W P-5'-P-CCNC: 18 U/L (ref 10–52)
ANION GAP SERPL CALC-SCNC: 10 MMOL/L (ref 10–20)
AST SERPL W P-5'-P-CCNC: 17 U/L (ref 9–39)
BASOPHILS # BLD AUTO: 0.12 X10*3/UL (ref 0–0.1)
BASOPHILS NFR BLD AUTO: 1.7 %
BILIRUB SERPL-MCNC: 0.6 MG/DL (ref 0–1.2)
BUN SERPL-MCNC: 9 MG/DL (ref 6–23)
CALCIUM SERPL-MCNC: 9.5 MG/DL (ref 8.6–10.3)
CHLORIDE SERPL-SCNC: 103 MMOL/L (ref 98–107)
CO2 SERPL-SCNC: 29 MMOL/L (ref 21–32)
CREAT SERPL-MCNC: 1.04 MG/DL (ref 0.5–1.3)
EOSINOPHIL # BLD AUTO: 0.22 X10*3/UL (ref 0–0.7)
EOSINOPHIL NFR BLD AUTO: 3.2 %
ERYTHROCYTE [DISTWIDTH] IN BLOOD BY AUTOMATED COUNT: 14.3 % (ref 11.5–14.5)
GFR SERPL CREATININE-BSD FRML MDRD: 82 ML/MIN/1.73M*2
GLUCOSE SERPL-MCNC: 99 MG/DL (ref 74–99)
HBV CORE AB SER QL: NONREACTIVE
HBV SURFACE AB SER-ACNC: <3.1 MIU/ML
HBV SURFACE AG SERPL QL IA: NONREACTIVE
HCT VFR BLD AUTO: 38.4 % (ref 41–52)
HGB BLD-MCNC: 13.5 G/DL (ref 13.5–17.5)
IMM GRANULOCYTES # BLD AUTO: 0.02 X10*3/UL (ref 0–0.7)
IMM GRANULOCYTES NFR BLD AUTO: 0.3 % (ref 0–0.9)
LYMPHOCYTES # BLD AUTO: 1.77 X10*3/UL (ref 1.2–4.8)
LYMPHOCYTES NFR BLD AUTO: 25.4 %
MCH RBC QN AUTO: 32.9 PG (ref 26–34)
MCHC RBC AUTO-ENTMCNC: 35.2 G/DL (ref 32–36)
MCV RBC AUTO: 94 FL (ref 80–100)
MONOCYTES # BLD AUTO: 0.75 X10*3/UL (ref 0.1–1)
MONOCYTES NFR BLD AUTO: 10.7 %
NEUTROPHILS # BLD AUTO: 4.1 X10*3/UL (ref 1.2–7.7)
NEUTROPHILS NFR BLD AUTO: 58.7 %
NRBC BLD-RTO: 0 /100 WBCS (ref 0–0)
PLATELET # BLD AUTO: 301 X10*3/UL (ref 150–450)
POTASSIUM SERPL-SCNC: 4 MMOL/L (ref 3.5–5.3)
PROT SERPL-MCNC: 6.5 G/DL (ref 6.4–8.2)
RBC # BLD AUTO: 4.1 X10*6/UL (ref 4.5–5.9)
SODIUM SERPL-SCNC: 138 MMOL/L (ref 136–145)
WBC # BLD AUTO: 7 X10*3/UL (ref 4.4–11.3)

## 2023-12-13 PROCEDURE — 85025 COMPLETE CBC W/AUTO DIFF WBC: CPT

## 2023-12-13 PROCEDURE — 2500000001 HC RX 250 WO HCPCS SELF ADMINISTERED DRUGS (ALT 637 FOR MEDICARE OP): Performed by: STUDENT IN AN ORGANIZED HEALTH CARE EDUCATION/TRAINING PROGRAM

## 2023-12-13 PROCEDURE — 2500000004 HC RX 250 GENERAL PHARMACY W/ HCPCS (ALT 636 FOR OP/ED): Performed by: STUDENT IN AN ORGANIZED HEALTH CARE EDUCATION/TRAINING PROGRAM

## 2023-12-13 PROCEDURE — 87340 HEPATITIS B SURFACE AG IA: CPT

## 2023-12-13 PROCEDURE — 86706 HEP B SURFACE ANTIBODY: CPT

## 2023-12-13 PROCEDURE — 86704 HEP B CORE ANTIBODY TOTAL: CPT

## 2023-12-13 PROCEDURE — 96413 CHEMO IV INFUSION 1 HR: CPT

## 2023-12-13 PROCEDURE — 96415 CHEMO IV INFUSION ADDL HR: CPT

## 2023-12-13 PROCEDURE — 96375 TX/PRO/DX INJ NEW DRUG ADDON: CPT | Mod: INF

## 2023-12-13 PROCEDURE — 80053 COMPREHEN METABOLIC PANEL: CPT

## 2023-12-13 RX ORDER — ALBUTEROL SULFATE 0.83 MG/ML
3 SOLUTION RESPIRATORY (INHALATION) AS NEEDED
Status: CANCELLED | OUTPATIENT
Start: 2023-12-20

## 2023-12-13 RX ORDER — PROCHLORPERAZINE MALEATE 10 MG
10 TABLET ORAL EVERY 6 HOURS PRN
Status: DISCONTINUED | OUTPATIENT
Start: 2023-12-13 | End: 2023-12-13 | Stop reason: HOSPADM

## 2023-12-13 RX ORDER — PROCHLORPERAZINE MALEATE 10 MG
10 TABLET ORAL EVERY 6 HOURS PRN
Status: CANCELLED | OUTPATIENT
Start: 2023-12-20

## 2023-12-13 RX ORDER — HEPARIN SODIUM,PORCINE/PF 10 UNIT/ML
50 SYRINGE (ML) INTRAVENOUS AS NEEDED
Status: CANCELLED | OUTPATIENT
Start: 2023-12-13

## 2023-12-13 RX ORDER — PROCHLORPERAZINE EDISYLATE 5 MG/ML
10 INJECTION INTRAMUSCULAR; INTRAVENOUS EVERY 6 HOURS PRN
Status: DISCONTINUED | OUTPATIENT
Start: 2023-12-13 | End: 2023-12-13 | Stop reason: HOSPADM

## 2023-12-13 RX ORDER — ACETAMINOPHEN 325 MG/1
650 TABLET ORAL ONCE
Status: CANCELLED | OUTPATIENT
Start: 2023-12-20

## 2023-12-13 RX ORDER — DIPHENHYDRAMINE HYDROCHLORIDE 50 MG/ML
50 INJECTION INTRAMUSCULAR; INTRAVENOUS AS NEEDED
Status: CANCELLED | OUTPATIENT
Start: 2023-12-20

## 2023-12-13 RX ORDER — ACETAMINOPHEN 325 MG/1
650 TABLET ORAL ONCE
Status: COMPLETED | OUTPATIENT
Start: 2023-12-13 | End: 2023-12-13

## 2023-12-13 RX ORDER — PROCHLORPERAZINE EDISYLATE 5 MG/ML
10 INJECTION INTRAMUSCULAR; INTRAVENOUS EVERY 6 HOURS PRN
Status: CANCELLED | OUTPATIENT
Start: 2023-12-20

## 2023-12-13 RX ORDER — FAMOTIDINE 10 MG/ML
20 INJECTION INTRAVENOUS ONCE AS NEEDED
Status: CANCELLED | OUTPATIENT
Start: 2023-12-20

## 2023-12-13 RX ORDER — DIPHENHYDRAMINE HCL 50 MG
50 CAPSULE ORAL ONCE
Status: CANCELLED | OUTPATIENT
Start: 2023-12-20

## 2023-12-13 RX ORDER — DIPHENHYDRAMINE HCL 50 MG
50 CAPSULE ORAL ONCE
Status: COMPLETED | OUTPATIENT
Start: 2023-12-13 | End: 2023-12-13

## 2023-12-13 RX ORDER — HEPARIN 100 UNIT/ML
500 SYRINGE INTRAVENOUS AS NEEDED
Status: CANCELLED | OUTPATIENT
Start: 2023-12-13

## 2023-12-13 RX ORDER — EPINEPHRINE 0.3 MG/.3ML
0.3 INJECTION SUBCUTANEOUS EVERY 5 MIN PRN
Status: CANCELLED | OUTPATIENT
Start: 2023-12-20

## 2023-12-13 RX ADMIN — ACETAMINOPHEN 650 MG: 325 TABLET ORAL at 09:03

## 2023-12-13 RX ADMIN — METHYLPREDNISOLONE SODIUM SUCCINATE 20 MG: 40 INJECTION, POWDER, FOR SOLUTION INTRAMUSCULAR; INTRAVENOUS at 09:03

## 2023-12-13 RX ADMIN — DIPHENHYDRAMINE HYDROCHLORIDE 50 MG: 50 CAPSULE ORAL at 09:03

## 2023-12-13 RX ADMIN — SODIUM CHLORIDE 645 MG: 9 INJECTION, SOLUTION INTRAVENOUS at 09:57

## 2023-12-13 ASSESSMENT — PAIN SCALES - GENERAL: PAINLEVEL_OUTOF10: 0 - NO PAIN

## 2023-12-13 NOTE — PROGRESS NOTES
Pt arrived ambulatory to infusion for treatment of Rituximab.  Denies any new or worsening symptoms. Tolerated infusion at standard rate without issue. Discharged in stable condition.

## 2023-12-13 NOTE — PROGRESS NOTES
"Patient ID: Cooper Tam is a 58 y.o. male.    Diagnosis: No matching staging information was found for the patient.,    Treatment:   Oncology History    No history exists.       Past Medical History:     Past Medical History:   Diagnosis Date    Personal history of other specified conditions 03/02/2018    History of urinary incontinence       Surgical History:     Past Surgical History:   Procedure Laterality Date    ANTERIOR CRUCIATE LIGAMENT REPAIR  09/21/2017    Primary Repair Of Knee Ligament Cruciate Anterior    CHOLECYSTECTOMY  09/21/2017    Cholecystectomy    CT ANGIO NECK  8/7/2023    CT NECK ANGIO W AND WO IV CONTRAST [facility]    MR HEAD ANGIO WO IV CONTRAST  12/31/2015    MR HEAD ANGIO WO IV CONTRAST [facility]    MR HEAD ANGIO WO IV CONTRAST  12/31/2015    MR HEAD ANGIO WO IV CONTRAST [facility]    SPLENECTOMY, TOTAL  09/21/2017    Splenectomy        Family History:     Family History   Problem Relation Name Age of Onset    Heart failure Father         Social History:     Social History     Tobacco Use    Smoking status: Never     Passive exposure: Never    Smokeless tobacco: Never   Substance Use Topics    Alcohol use: Never    Drug use: Never      -------------------------------------------------------------------------------------------------------  Subjective       The patient presents to the clinic today (12/13/23) for ***; overall, he is doing ***.    Review of Systems - Oncology   -------------------------------------------------------------------------------------------------------  Objective   BSA: 1.72 meters squared  /74 (BP Location: Right arm, Patient Position: Sitting, BP Cuff Size: Small adult)   Pulse 58   Temp 36.4 °C (97.5 °F) (Temporal)   Resp 18   Ht 1.656 m (5' 5.2\")   Wt 64 kg (141 lb 1.5 oz)   SpO2 98%   BMI 23.34 kg/m²     Physical Exam    Performance Status:  {ECOG performance " status:89865}  -------------------------------------------------------------------------------------------------------  Assessment/Plan    No problem-specific Assessment & Plan notes found for this encounter.      RTC:  - ***  - ***  - ***  - ***  - ***    -------------------------------------------------------------------------------------------------------  KERRY E REYE, RN

## 2024-01-04 ENCOUNTER — OFFICE VISIT (OUTPATIENT)
Dept: OPHTHALMOLOGY | Facility: CLINIC | Age: 59
End: 2024-01-04
Payer: MEDICARE

## 2024-01-04 DIAGNOSIS — H35.371 EPIRETINAL MEMBRANE (ERM) OF RIGHT EYE: Primary | ICD-10-CM

## 2024-01-04 DIAGNOSIS — B37.89 CANDIDA ENDOPHTHALMITIS: ICD-10-CM

## 2024-01-04 DIAGNOSIS — B58.01 TOXOPLASMA CHORIORETINITIS, RIGHT: ICD-10-CM

## 2024-01-04 PROCEDURE — 99213 OFFICE O/P EST LOW 20 MIN: CPT

## 2024-01-04 ASSESSMENT — CONF VISUAL FIELD
OS_SUPERIOR_TEMPORAL_RESTRICTION: 0
OS_INFERIOR_NASAL_RESTRICTION: 0
OD_NORMAL: 1
OS_INFERIOR_TEMPORAL_RESTRICTION: 0
OS_SUPERIOR_NASAL_RESTRICTION: 0
OD_INFERIOR_TEMPORAL_RESTRICTION: 0
OS_NORMAL: 1
OD_SUPERIOR_TEMPORAL_RESTRICTION: 0
OD_SUPERIOR_NASAL_RESTRICTION: 0
OD_INFERIOR_NASAL_RESTRICTION: 0

## 2024-01-04 ASSESSMENT — ENCOUNTER SYMPTOMS
HEMATOLOGIC/LYMPHATIC NEGATIVE: 0
GASTROINTESTINAL NEGATIVE: 0
NEUROLOGICAL NEGATIVE: 0
EYES NEGATIVE: 0
CARDIOVASCULAR NEGATIVE: 0
PSYCHIATRIC NEGATIVE: 0
ALLERGIC/IMMUNOLOGIC NEGATIVE: 0
MUSCULOSKELETAL NEGATIVE: 0
RESPIRATORY NEGATIVE: 0
ENDOCRINE NEGATIVE: 0
CONSTITUTIONAL NEGATIVE: 0

## 2024-01-04 ASSESSMENT — TONOMETRY
IOP_METHOD: TONOPEN
OD_IOP_MMHG: 16
OS_IOP_MMHG: 17

## 2024-01-04 ASSESSMENT — VISUAL ACUITY
OS_CC: 20/20
CORRECTION_TYPE: GLASSES
METHOD: SNELLEN - LINEAR
OD_CC: 20/20

## 2024-01-04 ASSESSMENT — EXTERNAL EXAM - RIGHT EYE: OD_EXAM: NORMAL

## 2024-01-04 ASSESSMENT — SLIT LAMP EXAM - LIDS
COMMENTS: NORMAL
COMMENTS: NORMAL

## 2024-01-04 ASSESSMENT — CUP TO DISC RATIO
OD_RATIO: 0.25
OS_RATIO: 0.25

## 2024-01-04 ASSESSMENT — EXTERNAL EXAM - LEFT EYE: OS_EXAM: NORMAL

## 2024-01-04 NOTE — PROGRESS NOTES
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 . No IRF or SRF, retinal thinning inferotemporally   -----OS: WNL; Normal foveal contour, RPE and outer retinal layers. No IRF or SRF      Impression:    1. ERM (2ry to uveitis) - slightly worse however, patient is not noticing any distortion or visual change      Observe for now      2. No active signs of inflammation     - can taper  pred qother day OU from daily OU  - continue  timolol ou bid

## 2024-01-10 ENCOUNTER — OFFICE VISIT (OUTPATIENT)
Dept: UROLOGY | Facility: CLINIC | Age: 59
End: 2024-01-10
Payer: MEDICARE

## 2024-01-10 VITALS — HEIGHT: 65 IN | BODY MASS INDEX: 23.48 KG/M2 | TEMPERATURE: 97 F

## 2024-01-10 DIAGNOSIS — N50.3 EPIDIDYMAL CYST: ICD-10-CM

## 2024-01-10 DIAGNOSIS — N13.8 BPH WITH OBSTRUCTION/LOWER URINARY TRACT SYMPTOMS: Primary | ICD-10-CM

## 2024-01-10 DIAGNOSIS — N40.1 BPH WITH OBSTRUCTION/LOWER URINARY TRACT SYMPTOMS: Primary | ICD-10-CM

## 2024-01-10 DIAGNOSIS — N52.9 ERECTILE DYSFUNCTION, UNSPECIFIED ERECTILE DYSFUNCTION TYPE: ICD-10-CM

## 2024-01-10 LAB
POC APPEARANCE, URINE: CLEAR
POC BILIRUBIN, URINE: NEGATIVE
POC BLOOD, URINE: ABNORMAL
POC COLOR, URINE: YELLOW
POC GLUCOSE, URINE: NEGATIVE MG/DL
POC KETONES, URINE: NEGATIVE MG/DL
POC LEUKOCYTES, URINE: ABNORMAL
POC NITRITE,URINE: NEGATIVE
POC PH, URINE: 6 PH
POC PROTEIN, URINE: NEGATIVE MG/DL
POC SPECIFIC GRAVITY, URINE: 1.02
POC UROBILINOGEN, URINE: 0.2 EU/DL

## 2024-01-10 PROCEDURE — 99204 OFFICE O/P NEW MOD 45 MIN: CPT | Performed by: UROLOGY

## 2024-01-10 PROCEDURE — 51798 US URINE CAPACITY MEASURE: CPT | Performed by: UROLOGY

## 2024-01-10 PROCEDURE — 81002 URINALYSIS NONAUTO W/O SCOPE: CPT | Performed by: UROLOGY

## 2024-01-10 PROCEDURE — 51741 ELECTRO-UROFLOWMETRY FIRST: CPT | Performed by: UROLOGY

## 2024-01-10 PROCEDURE — 1036F TOBACCO NON-USER: CPT | Performed by: UROLOGY

## 2024-01-10 RX ORDER — TAMSULOSIN HYDROCHLORIDE 0.4 MG/1
0.4 CAPSULE ORAL DAILY
Qty: 90 CAPSULE | Refills: 3 | Status: SHIPPED | OUTPATIENT
Start: 2024-01-10 | End: 2025-01-09

## 2024-01-10 RX ORDER — TADALAFIL 20 MG/1
20 TABLET ORAL DAILY PRN
Qty: 30 TABLET | Refills: 6 | Status: SHIPPED | OUTPATIENT
Start: 2024-01-10 | End: 2025-01-09

## 2024-01-10 ASSESSMENT — PAIN SCALES - GENERAL: PAINLEVEL: 0-NO PAIN

## 2024-01-10 NOTE — PROGRESS NOTES
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 He was previously seen in 2019 for BPH and ED. He reports having a cyst on his left testicle. It is painful. Denies any drainage.  [place] is uncomfortable. He had a scrotal ultrasound in 2020 that demonstrated a left epididymal head cyst measuring 1.2 x 1.1 x 1.0 cm. He reports having urinary frequency and urgency. He will have intermittent urge incontinence. Sometimes he will void 3 times within the hour. He drinks about 3-4 cups of coffee per day. He reports having intermittent nocturia x1. He is still taking 0.4 mg of Tamsulosin. He was previously taking 100 mg of Sildenafil with little response. Patient denies gross hematuria, dysuria, and flank pain. Patient denies a history of kidney stones. Patient is a non smoker.       Objective   Past Medical History:   Diagnosis Date    Personal history of other specified conditions 03/02/2018    History of urinary incontinence     Past Surgical History:   Procedure Laterality Date    ANTERIOR CRUCIATE LIGAMENT REPAIR  09/21/2017    Primary Repair Of Knee Ligament Cruciate Anterior    CHOLECYSTECTOMY  09/21/2017    Cholecystectomy    CT ANGIO NECK  8/7/2023    CT NECK ANGIO W AND WO IV CONTRAST [facility]    MR HEAD ANGIO WO IV CONTRAST  12/31/2015    MR HEAD ANGIO WO IV CONTRAST [facility]    MR HEAD ANGIO WO IV CONTRAST  12/31/2015    MR HEAD ANGIO WO IV CONTRAST [facility]    SPLENECTOMY, TOTAL  09/21/2017    Splenectomy     Current Outpatient Medications on File Prior to Visit   Medication Sig Dispense Refill    carvedilol (Coreg) 3.125 mg tablet Take by mouth 2 times a day with meals.      chlorhexidine (Peridex) 0.12 % solution Take by mouth.      citalopram (CeleXA) 20 mg tablet 2 tablets (40 mg) once daily.      citalopram (CeleXA) 40 mg tablet Take 1 tablet (40 mg) by mouth once daily.      fexofenadine  "(Allegra) 180 mg tablet Take 1 tablet (180 mg) by mouth once daily in the morning. Take before meals.      lisinopril 5 mg tablet Take 1 tablet (5 mg) by mouth once daily.      LORazepam (Ativan) 1 mg tablet TAKE 1 TO 2 TABLETS EVERY 12 HOURS AS NEEDED.      omeprazole (PriLOSEC) 20 mg DR capsule Take 1 capsule (20 mg) by mouth once every 24 hours.      oxyCODONE (Roxicodone) 5 mg immediate release tablet TAKE 1 TABLET EVERY 4 HOURS AS NEEDED.      pantoprazole (ProtoNix) 40 mg EC tablet Take 1 tablet (40 mg) by mouth once daily.      prednisoLONE acetate (Pred Forte) 1 % ophthalmic suspension INSTILL 1 DROP INTO RIGHT EYE 4 TIMES DAILY. 5 mL 1    predniSONE (Deltasone) 5 mg tablet TAKE 4 TABLETS DAILY.      pregabalin (Lyrica) 100 mg capsule       rosuvastatin (Crestor) 10 mg tablet Take 1 tablet (10 mg) by mouth once daily.      sulfamethoxazole-trimethoprim (Bactrim DS) 800-160 mg tablet Take 1 tablet by mouth 2 times a day.      temazepam (Restoril) 30 mg capsule 1 capsule at bedtime as needed Orally Once a day for 90 days      timolol (Timoptic) 0.5 % ophthalmic solution Administer 1 drop into the right eye 2 times a day.      traMADol (Ultram) 50 mg tablet 2 tablet as needed Orally twice daily for 90 day(s)      valACYclovir (Valtrex) 1 gram tablet Take 1 tablet (1,000 mg) by mouth 3 times a day.      voriconazole (Vfend) 200 mg tablet Take 1 tablet (200 mg) by mouth every 12 hours.      warfarin (Coumadin) 7.5 mg tablet 1 tab po daily on Tuesdays and Wednesdays      [DISCONTINUED] tamsulosin (Flomax) 0.4 mg 24 hr capsule Take 1 capsule (0.4 mg) by mouth once daily.      warfarin (Coumadin) 10 mg tablet 0.5 tablets (5 mg).       No current facility-administered medications on file prior to visit.     Allergies   Allergen Reactions    Duloxetine Hcl Nausea/vomiting    Heparin Rash    Heparin Combination Rash       Temp 36.1 °C (97 °F)   Ht 1.651 m (5' 5\")   BMI 23.48 kg/m²   Physical Exam    Lab Review  Lab " Results   Component Value Date    PSA 1.72 06/18/2019     PVR 0ml  Uroflow voided 38'cc's, poorly diagnostic.    Assessment/Plan     Diagnoses and all orders for this visit:  BPH with obstruction/lower urinary tract symptoms  -     POCT UA (nonautomated) manually resulted  -     Measure post void residual  -     PSA; Future  -     tamsulosin (Flomax) 0.4 mg 24 hr capsule; Take 1 capsule (0.4 mg) by mouth once daily.  Erectile dysfunction, unspecified erectile dysfunction type  -     tadalafil (Cialis) 20 mg tablet; Take 1 tablet (20 mg) by mouth once daily as needed for erectile dysfunction.  Epididymal cyst  -     US scrotum; Future    BPH with LUTS     Patients urinary symptoms are stable.     Has persistent urinary urgency and frequency and nocturia x1-2, however he does consume significant amount of caffeinated beverages.    We discussed behavioral modifications that can contribute to lessening irritative symptoms, namely evening fluid restriction, elevating one's legs for a while before bedtime, voiding right before bedtime, and avoiding bladder irritants like caffeine, carbonated beverages, dark-colored beverages, artificial sweeteners, acidic foods and beverages, and spicy foods.    We will refill Tamsulosin and continue to monitor LUTS.      2. Erectile dysfunction    Patient reports partial results with Sildenafil 100mg. We will initiate a trial of Cialis 20mg.     The patient has been diagnosed with ED and cardiac assessment according to the [place] criteria allows use of oral PDE-5 inhibitor. The patient understands that these medications are not initiators of erection and that he will still require sexual stimulation. If prescribed vardenafil or sildenafil, he was advised to take the medication 30-60 minutes prior to sexual activity and that the efficacy of the medication is decreased following a high-fat meal.     The patient verbalized understanding that nitrates such as nitroglycerine, isosorbide  mononitrate, isosorbide dinitrate and any other nitrate preparations are absolutely contradicted with the use of a PDE-5 inhibitor. The patient was advised to alert any medical personal of PDE- 5 inhibitor use should he seek urgent medical attention for any reason.     I explained the common adverse effects of therapy including but not limited to headache, flushing, dizziness, rash, upset stomach, diarrhea, nasal congestion, abnormal vision, back pain, and myalgia. Less common side effects are lightheadedness or blood pressure drop upon standing. We discussed that patients have  after using medications in this class, and sometimes the exact cause of death was not able to be determined. There is a very small risk of nonarteritic ischemic optic neuropathy, a rare cause of blindness that may be permanent while using medications in this class. Patient is instructed to immediately stop this medication and seek medical attention if he develops visual disturbances in one or both eyes.     We discussed that if he experiences erection lasting longer than four hours, he needs to seek immediate treatment at the ER as the longer he waits, the more damage is done to the penile tissue. The patient states that he is not withholding any information about his condition or the use of medications in order to receive a PDE5 inhibitor class medication. No barriers to learning were identified. After all of the patients' questions were satisfactorily answered, he expressed understanding the risks of therapy and wishes to proceed.     Follow up with Dr. Moise for further evaluation and management.    3. Epididymal Cyst     We will obtain a scrotal US.     Follow up with Dr. Moise to review.     All questions were answered to the patient's satisfaction. Patient agrees with the plan and wishes to proceed. Follow-up will be scheduled appropriately.     Scribed for Dr. Mabry by Prema Garcia. I , Dr Mabry, have personally  reviewed and agreed with the information entered by the Virtual Scribe.

## 2024-01-10 NOTE — PROGRESS NOTES
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 He was previously seen in 2019 for BPH and ED. He reports having a cyst on his left testicle. It is painful. Denies any drainage.  [place] is uncomfortable. He had a scrotal ultrasound in 2020 that demonstrated a left epididymal head cyst measuring 1.2 x 1.1 x 1.0 cm. He reports having urinary frequency and urgency. He will have intermittent urge incontinence. Sometimes he will void 3 times within the hour. He drinks about 3-4 cups of coffee per day. He reports having intermittent nocturia x1. He is still taking 0.4 mg of Tamsulosin. He was previously taking 100 mg of Sildenafil with little response. Patient denies gross hematuria, dysuria, and flank pain. Patient denies a history of kidney stones. Patient is a non smoker.

## 2024-01-22 DIAGNOSIS — N50.3 EPIDIDYMAL CYST: Primary | ICD-10-CM

## 2024-01-25 ENCOUNTER — HOSPITAL ENCOUNTER (OUTPATIENT)
Dept: RADIOLOGY | Facility: CLINIC | Age: 59
Discharge: HOME | End: 2024-01-25
Payer: MEDICARE

## 2024-01-25 ENCOUNTER — APPOINTMENT (OUTPATIENT)
Dept: RADIOLOGY | Facility: CLINIC | Age: 59
End: 2024-01-25
Payer: MEDICARE

## 2024-01-25 DIAGNOSIS — N50.3 EPIDIDYMAL CYST: ICD-10-CM

## 2024-01-25 PROCEDURE — 93975 VASCULAR STUDY: CPT | Mod: 59

## 2024-01-25 PROCEDURE — 93976 VASCULAR STUDY: CPT | Mod: DISTINCT PROCEDURAL SERVICE | Performed by: RADIOLOGY

## 2024-01-25 PROCEDURE — 76870 US EXAM SCROTUM: CPT | Mod: DISTINCT PROCEDURAL SERVICE | Performed by: RADIOLOGY

## 2024-02-26 ENCOUNTER — APPOINTMENT (OUTPATIENT)
Dept: HEMATOLOGY/ONCOLOGY | Facility: HOSPITAL | Age: 59
End: 2024-02-26
Payer: MEDICARE

## 2024-03-06 NOTE — CONSULTS
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 Per patient, his dogs were agitated at the sound of fire trucks outside, so the patient got between them to calm them down, at which time his 200lb English Mastiff jumped up and bit the right side of his neck. He last took his warfarin  Sunday 8/6 before the dog bite.    Currently, the patient does not have any major complaints. He has not noticed any bleeding. The site is currently packed and bandaged with no obvious oozing seen.    Hematology consulted for ensuring INR is <2.0 before patient is taken to OR on 8/10 for repair of the wound. INR yesterday afternoon 7pm was 3.0, at midnight it was 2.5.    Notably, the patient has a heparin allergy (diffuse rash). This is confirmed with the patient and documented on the chart since 2011. Patient and wife state he is at high risk for DVT development as he paused AC years ago and soon after developed clots.    Past Medical/Surgical History:        Medical History:   History of idiopathic thrombocytopenic purpura:    Cold autoimmune hemolytic anemia:    DVT (deep venous thrombosis):    Anxiety disorder:    Major depressive disorder:    Acute ITP:    Flores syndrome:     Review Family/Social History and ROS:   Family History:  Family History:    No family history of any bleeding or coagulation  disorders per patient.    Social History:    Smoking Status: never smoker  (1)   Alcohol Use: denies (1)   Drug Use: denies  (1)   Drug 2 Use: denies  (2)            Allergies:  ·  heparin : Rash    Objective:     Objective Information:        T   P  R  BP   MAP  SpO2   Value  36.7  79  18  116/70      96%  Date/Time 8/9 8:00 8/9 8:00 8/9 8:00 8/9 8:00    8/9 8:00  Range  (35.8C - 36.7C )  (66 - 79 )  (16 - 18 )  (116 - 146 )/ (70 - 84 )    (96% - 99% )    Physical Exam Narrative:  ·  Physical Exam:    General: Appears stated age, well nourished, A&Ox3, conversational, sitting comfortably in bed, not in pain or distress. On room air.  Eyes: EOMI, PERRL.  HEENT: Mucous membranes moist.  Head/Neck: Normocephalic, neck wrapped in thick gauze and noted severe swelling and dried blood on anterior right portion of the neck  Respiratory: clear to auscultation bilaterally, no wheezing, rales, or rhonchi,   Cardiovascular: regular rhythm, normal S1 and S2. No added sounds or murmurs,  Gastrointestinal: soft, non-tender abdomen, bowel sounds present, no guarding or rebound.   MSK: Normal range of motion, no joint swelling, no redness,  Extremities: Normal appearing skin. No pitting edema in lower extremities.  Neurological: CN II-XII intact, no focal neurologic deficits.  Psychological: Appropriate mood, normal affect.      Recent Lab Results:    Results:        I have reviewed these laboratory results:    Complete Blood Count + Differential  09-Aug-2023 00:08:00      Result Value    White Blood Cell Count  13.8   H   Nucleated Erythrocyte Count  0.0    Red Blood Cell Count  3.91   L   HGB  12.8   L   HCT  37.0   L   MCV  95    MCHC  34.6    PLT  289    RDW-CV  14.4    Neutrophil %  73.0    Immature Granulocytes %  0.4    Lymphocyte %  14.7    Monocyte %  9.4    Eosinophil %  2.0    Basophil %  0.5    Neutrophil Count  10.04   H   Lymphocyte Count  2.03    Monocyte Count  1.30   H   Eosinophil Count  0.27    Basophil Count   0.07      Coagulation Screen  09-Aug-2023 00:08:00      Result Value    Prothrombin Time, Plasma  27.9   H   International Normalized Ratio, Plasma  2.5   H   Activated Partial Thromboplastin Time  42   H     Comprehensive Metabolic Panel  09-Aug-2023 00:08:00      Result Value    Glucose, Serum  94    NA  139    K  5.2    CL  105    Bicarbonate, Serum  25    Anion Gap, Serum  14    BUN  13    CREAT  1.16    GFR Male  72    Calcium, Serum  9.1    ALB  4.1    ALKP  65    T Pro  6.1   L   T Bili  0.8    Alanine Aminotransferase, Serum  9   L   Aspartate Transaminase, Serum  18      Renal Function Panel  07-Aug-2023 19:37:00      Result Value    Glucose, Serum  83    NA  142    K  3.7    CL  108   H   Bicarbonate, Serum  25    Anion Gap, Serum  13    BUN  10    CREAT  1.00    GFR Male  86    Calcium, Serum  9.0    Phosphorus, Serum  2.9    ALB  4.0        Radiology Results:    Results:    .      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 Per patient, his dogs were agitated at the sound of fire trucks outside, so the patient got between them to calm them down, at which time his 200lb English Mastiff jumped up and bit  the right side of his neck. He last took his warfarin Sunday 8/6 before the dog bite.    # therapeutic INR, but need for INR<2.0 prior to surgery planned for 8/10  - given patient's INR has downtrended slowly and has reached 2.0 sa of 8/9 12pm, the patient does NOT require vitamin K/FFP/other reversal agents  - patient is at high risk for DVTs  given prior history of developing DVTs after pausing warfarin for a period of time, therefore he must be resumed on anticoagulation when primary team determines it is safe t odo so after surgery  - patient has a known allergy to heparin (diffuse hives) therefore we do NOT  recommend any heparin-based products; the patient requires bridging with fondaparinux 7.5  mg SC daily injection as he restarts warfarin 10 mg daily until his INR is at goal (2.5-3.0)     # Flores syndrome (ITP and mixed CAD/WAIHA)  These are stable, as patient continues his q6 month infusions of rituximab outpatient and currently CBC counts are WNL    Thank you for this consult. We will continue to follow the patient.    Consult Status:  Consult Status    (select all that apply): initial  consult complete, will follow   Consult Order ID: [id number]     Attestation:   Note Completion:  I am a:  Resident/Fellow   Attending Attestation I saw and evaluated the patient.  I personally obtained the key and critical portions of the history and physical exam or was physically present for key and  critical portions performed by the resident/fellow. I reviewed the resident/fellow?s documentation and discussed the patient with the resident/fellow.  I agree with the resident/fellow?s medical decision making as documented in the note.     I personally evaluated the patient on 09-Aug-2023   Comments/ Additional Findings    Heparin allergy in the chart usually refers to HIT which is a contraindication to further use of heparin products.   Given recurrent VTEs will likely need to initiate at least prophylactic dose bipin at 24-hrs post-op and eventually bridge back to warfarin on full dose bipin, INR goal 2.5-3 per primary hematologist.           Electronic Signatures:  Thanh Huang)  (Signed 10-Aug-2023 11:36)   Authored: Note Completion   Co-Signer: Service, History of Present Illness, Past Medical/Surgical History, Review Family/Social History and ROS,  "Allergies, Objective,  Assessment/Recommendations, Note Completion  Randolph Zafar (Fellow))  (Signed 09-Aug-2023 21:09)   Authored: Service, History of Present Illness, Past Medical/Surgical  History, Review Family/Social History and ROS, Allergies, Objective, Assessment/Recommendations, Note Completion      Last Updated: 10-Aug-2023 11:36 by Thanh Huang)    References:  1.  Data Referenced From \"Patient Profile - Adult v2\" 09-Aug-2023 04:18  2.  Data Referenced From \"Risk Screen - Adult Emergency\" 07-Aug-2023 22:55   " Principal Discharge DX:	Dizziness Principal Discharge DX:	Dizziness  Secondary Diagnosis:	Urinary tract infection without hematuria, site unspecified

## 2024-03-07 ENCOUNTER — LAB (OUTPATIENT)
Dept: LAB | Facility: HOSPITAL | Age: 59
End: 2024-03-07
Payer: MEDICARE

## 2024-03-07 ENCOUNTER — OFFICE VISIT (OUTPATIENT)
Dept: HEMATOLOGY/ONCOLOGY | Facility: HOSPITAL | Age: 59
End: 2024-03-07
Payer: MEDICARE

## 2024-03-07 VITALS
TEMPERATURE: 97.2 F | RESPIRATION RATE: 16 BRPM | OXYGEN SATURATION: 100 % | WEIGHT: 145 LBS | DIASTOLIC BLOOD PRESSURE: 71 MMHG | HEART RATE: 57 BPM | SYSTOLIC BLOOD PRESSURE: 142 MMHG | BODY MASS INDEX: 24.13 KG/M2

## 2024-03-07 DIAGNOSIS — D69.41 EVAN'S SYNDROME (MULTI): ICD-10-CM

## 2024-03-07 DIAGNOSIS — D59.12 COLD AGGLUTININ DISEASE (MULTI): Primary | ICD-10-CM

## 2024-03-07 LAB
ACANTHOCYTES BLD QL SMEAR: NORMAL
ALBUMIN SERPL BCP-MCNC: 4.5 G/DL (ref 3.4–5)
ALP SERPL-CCNC: 73 U/L (ref 33–136)
ALT SERPL W P-5'-P-CCNC: 11 U/L (ref 10–52)
ANION GAP SERPL CALC-SCNC: 11 MMOL/L (ref 10–20)
AST SERPL W P-5'-P-CCNC: 13 U/L (ref 9–39)
BASOPHILS # BLD AUTO: 0.12 X10*3/UL (ref 0–0.1)
BASOPHILS NFR BLD AUTO: 1.6 %
BILIRUB SERPL-MCNC: 0.4 MG/DL (ref 0–1.2)
BUN SERPL-MCNC: 12 MG/DL (ref 6–23)
BURR CELLS BLD QL SMEAR: NORMAL
CALCIUM SERPL-MCNC: 9.1 MG/DL (ref 8.6–10.3)
CHLORIDE SERPL-SCNC: 105 MMOL/L (ref 98–107)
CO2 SERPL-SCNC: 28 MMOL/L (ref 21–32)
CREAT SERPL-MCNC: 0.96 MG/DL (ref 0.5–1.3)
DAT-POLYSPECIFIC: NORMAL
EGFRCR SERPLBLD CKD-EPI 2021: 90 ML/MIN/1.73M*2
EOSINOPHIL # BLD AUTO: 0.18 X10*3/UL (ref 0–0.7)
EOSINOPHIL NFR BLD AUTO: 2.4 %
ERYTHROCYTE [DISTWIDTH] IN BLOOD BY AUTOMATED COUNT: 14.2 % (ref 11.5–14.5)
GLUCOSE SERPL-MCNC: 83 MG/DL (ref 74–99)
HCT VFR BLD AUTO: 39.4 % (ref 41–52)
HGB BLD-MCNC: 13.7 G/DL (ref 13.5–17.5)
HGB RETIC QN: 34 PG (ref 28–38)
HOWELL-JOLLY BOD BLD QL SMEAR: PRESENT
IMM GRANULOCYTES # BLD AUTO: 0.02 X10*3/UL (ref 0–0.7)
IMM GRANULOCYTES NFR BLD AUTO: 0.3 % (ref 0–0.9)
IMMATURE RETIC FRACTION: 3.8 %
LDH SERPL L TO P-CCNC: 131 U/L (ref 84–246)
LYMPHOCYTES # BLD AUTO: 2.28 X10*3/UL (ref 1.2–4.8)
LYMPHOCYTES NFR BLD AUTO: 30.6 %
MCH RBC QN AUTO: 33.1 PG (ref 26–34)
MCHC RBC AUTO-ENTMCNC: 34.8 G/DL (ref 32–36)
MCV RBC AUTO: 95 FL (ref 80–100)
MONOCYTES # BLD AUTO: 0.78 X10*3/UL (ref 0.1–1)
MONOCYTES NFR BLD AUTO: 10.5 %
NEUTROPHILS # BLD AUTO: 4.06 X10*3/UL (ref 1.2–7.7)
NEUTROPHILS NFR BLD AUTO: 54.6 %
NRBC BLD-RTO: 0 /100 WBCS (ref 0–0)
PLATELET # BLD AUTO: 275 X10*3/UL (ref 150–450)
POTASSIUM SERPL-SCNC: 4.1 MMOL/L (ref 3.5–5.3)
PROT SERPL-MCNC: 6.7 G/DL (ref 6.4–8.2)
RBC # BLD AUTO: 4.14 X10*6/UL (ref 4.5–5.9)
RBC MORPH BLD: NORMAL
RETICS #: 0.05 X10*6/UL (ref 0.02–0.12)
RETICS/RBC NFR AUTO: 1.3 % (ref 0.5–2)
SODIUM SERPL-SCNC: 140 MMOL/L (ref 136–145)
WBC # BLD AUTO: 7.4 X10*3/UL (ref 4.4–11.3)

## 2024-03-07 PROCEDURE — 80053 COMPREHEN METABOLIC PANEL: CPT

## 2024-03-07 PROCEDURE — 36415 COLL VENOUS BLD VENIPUNCTURE: CPT

## 2024-03-07 PROCEDURE — 85045 AUTOMATED RETICULOCYTE COUNT: CPT

## 2024-03-07 PROCEDURE — 85025 COMPLETE CBC W/AUTO DIFF WBC: CPT

## 2024-03-07 PROCEDURE — 99215 OFFICE O/P EST HI 40 MIN: CPT | Performed by: STUDENT IN AN ORGANIZED HEALTH CARE EDUCATION/TRAINING PROGRAM

## 2024-03-07 PROCEDURE — 83615 LACTATE (LD) (LDH) ENZYME: CPT

## 2024-03-07 PROCEDURE — 86880 COOMBS TEST DIRECT: CPT

## 2024-03-07 PROCEDURE — 83010 ASSAY OF HAPTOGLOBIN QUANT: CPT

## 2024-03-07 PROCEDURE — 1036F TOBACCO NON-USER: CPT | Performed by: STUDENT IN AN ORGANIZED HEALTH CARE EDUCATION/TRAINING PROGRAM

## 2024-03-07 ASSESSMENT — ENCOUNTER SYMPTOMS: DEPRESSION: 0

## 2024-03-07 ASSESSMENT — PAIN SCALES - GENERAL: PAINLEVEL: 0-NO PAIN
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
## 2024-03-08 LAB — HAPTOGLOB SERPL-MCNC: 138 MG/DL (ref 37–246)

## 2024-03-19 ENCOUNTER — TELEPHONE (OUTPATIENT)
Dept: HEMATOLOGY/ONCOLOGY | Facility: HOSPITAL | Age: 59
End: 2024-03-19
Payer: MEDICARE

## 2024-03-19 NOTE — TELEPHONE ENCOUNTER
"RN called and informed him of MD Huang' message: \"No significant hemolysis on labs, plt also WNL. Lab did not draw his cold agglutinin titer, but last was not detectable. Follow up with labs week before in 6 months, I have entered labs. \" He verbalized understanding.  "

## 2024-04-04 ENCOUNTER — OFFICE VISIT (OUTPATIENT)
Dept: OPHTHALMOLOGY | Facility: CLINIC | Age: 59
End: 2024-04-04
Payer: MEDICARE

## 2024-04-04 DIAGNOSIS — H35.371 EPIRETINAL MEMBRANE (ERM) OF RIGHT EYE: Primary | ICD-10-CM

## 2024-04-04 PROCEDURE — 99213 OFFICE O/P EST LOW 20 MIN: CPT

## 2024-04-04 PROCEDURE — 92134 CPTRZ OPH DX IMG PST SGM RTA: CPT | Mod: BILATERAL PROCEDURE

## 2024-04-04 ASSESSMENT — CONF VISUAL FIELD
OD_INFERIOR_TEMPORAL_RESTRICTION: 0
OD_INFERIOR_NASAL_RESTRICTION: 0
OS_SUPERIOR_NASAL_RESTRICTION: 0
OD_SUPERIOR_NASAL_RESTRICTION: 0
OD_NORMAL: 1
OS_INFERIOR_TEMPORAL_RESTRICTION: 0
OS_INFERIOR_NASAL_RESTRICTION: 0
OS_NORMAL: 1
OD_SUPERIOR_TEMPORAL_RESTRICTION: 0
OS_SUPERIOR_TEMPORAL_RESTRICTION: 0

## 2024-04-04 ASSESSMENT — TONOMETRY
OS_IOP_MMHG: 15
OD_IOP_MMHG: 14
IOP_METHOD: TONOPEN

## 2024-04-04 ASSESSMENT — ENCOUNTER SYMPTOMS
CONSTITUTIONAL NEGATIVE: 0
HEMATOLOGIC/LYMPHATIC NEGATIVE: 0
CARDIOVASCULAR NEGATIVE: 0
RESPIRATORY NEGATIVE: 0
PSYCHIATRIC NEGATIVE: 0
GASTROINTESTINAL NEGATIVE: 0
EYES NEGATIVE: 0
ALLERGIC/IMMUNOLOGIC NEGATIVE: 0
ENDOCRINE NEGATIVE: 0
NEUROLOGICAL NEGATIVE: 0
MUSCULOSKELETAL NEGATIVE: 0

## 2024-04-04 ASSESSMENT — VISUAL ACUITY
OD_CC: 20/25
CORRECTION_TYPE: GLASSES
METHOD: SNELLEN - LINEAR
OS_CC: 20/25
OD_CC+: +2
OS_CC+: +2

## 2024-04-04 ASSESSMENT — SLIT LAMP EXAM - LIDS
COMMENTS: NORMAL
COMMENTS: NORMAL

## 2024-04-04 ASSESSMENT — EXTERNAL EXAM - RIGHT EYE: OD_EXAM: NORMAL

## 2024-04-04 ASSESSMENT — CUP TO DISC RATIO
OD_RATIO: 0.25
OS_RATIO: 0.25

## 2024-04-04 ASSESSMENT — EXTERNAL EXAM - LEFT EYE: OS_EXAM: NORMAL

## 2024-04-04 NOTE — PROGRESS NOTES
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   Toxoplasma Chorioretinitis  - Diagnosed on 1/11/22 while he was hospitalized   - Toxoplasmosis PCR 6900 copies, toxoplasmosis IgM positive  - S/p Tap and Inject for suspected fungal endogenous endophthalmitis (suspected from a PICC line during his 3 weeks stay at the hospital)  - Workup: negative   - Patient with recent onset of floaters (unsure which eye) which improved after he started pred 1x/day in both eyes    - in left eye can appreciate PVD with vitreous floaters, which are pigmented - no evidence of vitritis, also has PVD and prior chorioretinal scar OD      - OCT 04/04/24    -----OD: ERM, stable retinal thickness. No IRF or SRF, retinal thinning inferotemporally   -----OS: WNL; Normal foveal contour, RPE and outer retinal layers. No IRF or SRF      Impression:   1. 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 No active signs of inflammation     - can taper  pred qother day OU from daily OU    - continue  timolol ou bid

## 2024-04-24 ENCOUNTER — HOSPITAL ENCOUNTER (OUTPATIENT)
Dept: CARDIOLOGY | Facility: CLINIC | Age: 59
Discharge: HOME | End: 2024-04-24
Payer: MEDICARE

## 2024-04-24 DIAGNOSIS — I42.9 CARDIOMYOPATHY, UNSPECIFIED (MULTI): ICD-10-CM

## 2024-04-24 PROCEDURE — 93306 TTE W/DOPPLER COMPLETE: CPT | Performed by: INTERNAL MEDICINE

## 2024-04-24 PROCEDURE — 93306 TTE W/DOPPLER COMPLETE: CPT

## 2024-04-28 LAB
AORTIC VALVE PEAK VELOCITY: 0.93 M/S
AV PEAK GRADIENT: 3.5 MMHG
AVA (PEAK VEL): 2.98 CM2
EJECTION FRACTION APICAL 4 CHAMBER: 49.7
LEFT ATRIUM VOLUME AREA LENGTH INDEX BSA: 28.9 ML/M2
LEFT VENTRICLE INTERNAL DIMENSION DIASTOLE: 5.52 CM (ref 3.5–6)
LEFT VENTRICULAR OUTFLOW TRACT DIAMETER: 2.06 CM
LV EJECTION FRACTION BIPLANE: 55 %
MITRAL VALVE E/A RATIO: 0.78
MITRAL VALVE E/E' RATIO: 6.99
RIGHT VENTRICLE FREE WALL PEAK S': 9 CM/S
RIGHT VENTRICLE PEAK SYSTOLIC PRESSURE: 27.4 MMHG
TRICUSPID ANNULAR PLANE SYSTOLIC EXCURSION: 2 CM

## 2024-07-11 ENCOUNTER — TELEPHONE (OUTPATIENT)
Dept: ADMISSION | Facility: HOSPITAL | Age: 59
End: 2024-07-11
Payer: MEDICARE

## 2024-07-11 NOTE — TELEPHONE ENCOUNTER
Pt made aware of Dr. Huang comment below, however he spoke with Ju Ngo (his PCP) said she would not fill these out, she advised to send to Dr. Huang, this has to come from Dr. Huang since he has been on disability since 2017 (formally from Dr. Hernandez). States his disability has always come from [facility]. Was upset by my call.    Please advise

## 2024-07-11 NOTE — TELEPHONE ENCOUNTER
Pt checking if team has received and are working on disability paperwork. States wife faxed earlier this week.

## 2024-07-12 NOTE — TELEPHONE ENCOUNTER
Attempted to notify pt of Dr. Huang directive that labs are WNL- and no disability paperwork will not be completed from oncology perspective.   Pt did not answer and unable to leave VM

## 2024-07-24 NOTE — TELEPHONE ENCOUNTER
Pt called to check on status of disability paperwork and was informed again that his labs are normal and Dr Huang has no reason to recommend disability. Pt was upset by this information.

## 2024-08-29 ENCOUNTER — OFFICE VISIT (OUTPATIENT)
Dept: ORTHOPEDIC SURGERY | Facility: CLINIC | Age: 59
End: 2024-08-29
Payer: MEDICARE

## 2024-08-29 ENCOUNTER — LAB (OUTPATIENT)
Dept: LAB | Facility: LAB | Age: 59
End: 2024-08-29
Payer: MEDICARE

## 2024-08-29 DIAGNOSIS — I42.9 CARDIOMYOPATHY, UNSPECIFIED (MULTI): Primary | ICD-10-CM

## 2024-08-29 DIAGNOSIS — E55.9 VITAMIN D DEFICIENCY, UNSPECIFIED: ICD-10-CM

## 2024-08-29 DIAGNOSIS — M65.30 TRIGGER FINGER, ACQUIRED: Primary | ICD-10-CM

## 2024-08-29 DIAGNOSIS — E53.8 DEFICIENCY OF OTHER SPECIFIED B GROUP VITAMINS: ICD-10-CM

## 2024-08-29 DIAGNOSIS — Z12.5 ENCOUNTER FOR SCREENING FOR MALIGNANT NEOPLASM OF PROSTATE: ICD-10-CM

## 2024-08-29 DIAGNOSIS — G56.03 BILATERAL CARPAL TUNNEL SYNDROME: ICD-10-CM

## 2024-08-29 DIAGNOSIS — D69.41: ICD-10-CM

## 2024-08-29 DIAGNOSIS — E78.2 MIXED HYPERLIPIDEMIA: ICD-10-CM

## 2024-08-29 LAB
25(OH)D3 SERPL-MCNC: 34 NG/ML (ref 30–100)
ALBUMIN SERPL BCP-MCNC: 4.4 G/DL (ref 3.4–5)
ALP SERPL-CCNC: 81 U/L (ref 33–136)
ALT SERPL W P-5'-P-CCNC: 11 U/L (ref 10–52)
ANION GAP SERPL CALC-SCNC: 13 MMOL/L (ref 10–20)
AST SERPL W P-5'-P-CCNC: 11 U/L (ref 9–39)
BASOPHILS # BLD AUTO: 0.18 X10*3/UL (ref 0–0.1)
BASOPHILS NFR BLD AUTO: 2.2 %
BILIRUB SERPL-MCNC: 0.6 MG/DL (ref 0–1.2)
BUN SERPL-MCNC: 10 MG/DL (ref 6–23)
CALCIUM SERPL-MCNC: 9.2 MG/DL (ref 8.6–10.6)
CHLORIDE SERPL-SCNC: 108 MMOL/L (ref 98–107)
CHOLEST SERPL-MCNC: 250 MG/DL (ref 0–199)
CHOLESTEROL/HDL RATIO: 6.3
CO2 SERPL-SCNC: 26 MMOL/L (ref 21–32)
CREAT SERPL-MCNC: 0.87 MG/DL (ref 0.5–1.3)
EGFRCR SERPLBLD CKD-EPI 2021: >90 ML/MIN/1.73M*2
EOSINOPHIL # BLD AUTO: 0.66 X10*3/UL (ref 0–0.7)
EOSINOPHIL NFR BLD AUTO: 8.2 %
ERYTHROCYTE [DISTWIDTH] IN BLOOD BY AUTOMATED COUNT: 15.3 % (ref 11.5–14.5)
GLUCOSE SERPL-MCNC: 96 MG/DL (ref 74–99)
HCT VFR BLD AUTO: 42.4 % (ref 41–52)
HDLC SERPL-MCNC: 39.6 MG/DL
HGB BLD-MCNC: 14.2 G/DL (ref 13.5–17.5)
IMM GRANULOCYTES # BLD AUTO: 0.02 X10*3/UL (ref 0–0.7)
IMM GRANULOCYTES NFR BLD AUTO: 0.2 % (ref 0–0.9)
IRON SATN MFR SERPL: 36 % (ref 25–45)
IRON SERPL-MCNC: 107 UG/DL (ref 35–150)
LDLC SERPL CALC-MCNC: 161 MG/DL
LYMPHOCYTES # BLD AUTO: 2.27 X10*3/UL (ref 1.2–4.8)
LYMPHOCYTES NFR BLD AUTO: 28.1 %
MAGNESIUM SERPL-MCNC: 2 MG/DL (ref 1.6–2.4)
MCH RBC QN AUTO: 32.3 PG (ref 26–34)
MCHC RBC AUTO-ENTMCNC: 33.5 G/DL (ref 32–36)
MCV RBC AUTO: 97 FL (ref 80–100)
MONOCYTES # BLD AUTO: 0.67 X10*3/UL (ref 0.1–1)
MONOCYTES NFR BLD AUTO: 8.3 %
NEUTROPHILS # BLD AUTO: 4.27 X10*3/UL (ref 1.2–7.7)
NEUTROPHILS NFR BLD AUTO: 53 %
NON HDL CHOLESTEROL: 210 MG/DL (ref 0–149)
NRBC BLD-RTO: 0 /100 WBCS (ref 0–0)
PLATELET # BLD AUTO: 277 X10*3/UL (ref 150–450)
POTASSIUM SERPL-SCNC: 4.5 MMOL/L (ref 3.5–5.3)
PROT SERPL-MCNC: 6.4 G/DL (ref 6.4–8.2)
RBC # BLD AUTO: 4.39 X10*6/UL (ref 4.5–5.9)
RBC MORPH BLD: NORMAL
SODIUM SERPL-SCNC: 142 MMOL/L (ref 136–145)
TIBC SERPL-MCNC: 300 UG/DL (ref 240–445)
TRIGL SERPL-MCNC: 247 MG/DL (ref 0–149)
TSH SERPL-ACNC: 1.78 MIU/L (ref 0.44–3.98)
UIBC SERPL-MCNC: 193 UG/DL (ref 110–370)
VIT B12 SERPL-MCNC: 310 PG/ML (ref 211–911)
VLDL: 49 MG/DL (ref 0–40)
WBC # BLD AUTO: 8.1 X10*3/UL (ref 4.4–11.3)

## 2024-08-29 PROCEDURE — 83735 ASSAY OF MAGNESIUM: CPT

## 2024-08-29 PROCEDURE — 99214 OFFICE O/P EST MOD 30 MIN: CPT | Performed by: ORTHOPAEDIC SURGERY

## 2024-08-29 PROCEDURE — 2500000004 HC RX 250 GENERAL PHARMACY W/ HCPCS (ALT 636 FOR OP/ED): Performed by: ORTHOPAEDIC SURGERY

## 2024-08-29 PROCEDURE — 85025 COMPLETE CBC W/AUTO DIFF WBC: CPT

## 2024-08-29 PROCEDURE — 82306 VITAMIN D 25 HYDROXY: CPT

## 2024-08-29 PROCEDURE — 82607 VITAMIN B-12: CPT

## 2024-08-29 PROCEDURE — 80061 LIPID PANEL: CPT

## 2024-08-29 PROCEDURE — 20550 NJX 1 TENDON SHEATH/LIGAMENT: CPT | Mod: RT | Performed by: ORTHOPAEDIC SURGERY

## 2024-08-29 PROCEDURE — 84154 ASSAY OF PSA FREE: CPT

## 2024-08-29 PROCEDURE — 2500000005 HC RX 250 GENERAL PHARMACY W/O HCPCS: Performed by: ORTHOPAEDIC SURGERY

## 2024-08-29 PROCEDURE — 80053 COMPREHEN METABOLIC PANEL: CPT

## 2024-08-29 PROCEDURE — 36415 COLL VENOUS BLD VENIPUNCTURE: CPT

## 2024-08-29 PROCEDURE — 83540 ASSAY OF IRON: CPT

## 2024-08-29 PROCEDURE — 84443 ASSAY THYROID STIM HORMONE: CPT

## 2024-08-29 PROCEDURE — 83550 IRON BINDING TEST: CPT

## 2024-08-29 PROCEDURE — G0103 PSA SCREENING: HCPCS

## 2024-08-29 RX ORDER — LIDOCAINE HYDROCHLORIDE 10 MG/ML
1 INJECTION INFILTRATION; PERINEURAL
Status: COMPLETED | OUTPATIENT
Start: 2024-08-29 | End: 2024-08-29

## 2024-08-29 RX ORDER — TRIAMCINOLONE ACETONIDE 40 MG/ML
40 INJECTION, SUSPENSION INTRA-ARTICULAR; INTRAMUSCULAR
Status: COMPLETED | OUTPATIENT
Start: 2024-08-29 | End: 2024-08-29

## 2024-08-29 ASSESSMENT — PAIN SCALES - GENERAL: PAINLEVEL_OUTOF10: 7

## 2024-08-29 ASSESSMENT — ENCOUNTER SYMPTOMS
OCCASIONAL FEELINGS OF UNSTEADINESS: 0
LOSS OF SENSATION IN FEET: 0
DEPRESSION: 0

## 2024-08-29 ASSESSMENT — PATIENT HEALTH QUESTIONNAIRE - PHQ9
2. FEELING DOWN, DEPRESSED OR HOPELESS: NOT AT ALL
1. LITTLE INTEREST OR PLEASURE IN DOING THINGS: NOT AT ALL
SUM OF ALL RESPONSES TO PHQ9 QUESTIONS 1 AND 2: 0

## 2024-08-29 ASSESSMENT — PAIN - FUNCTIONAL ASSESSMENT: PAIN_FUNCTIONAL_ASSESSMENT: 0-10

## 2024-08-30 NOTE — PROGRESS NOTES
History of Present Illness:  Chief Complaint   Patient presents with    Right Wrist - Follow-up     CTS    Left Wrist - Follow-up     CTS    Right Hand - Follow-up     Right middle and small finger trigger       Patient presents for repeat evaluation of bilateral carpal tunnel syndrome and locking and catching of his right middle, ring and small fingers.  He has undergone previous corticosteroid injections for his right middle and small finger trigger fingers.  Last injections were February 2023.  About 2 months ago symptoms began recurring with associated locking and catching.  He has also had increasing intermittent numbness and tingling into both of his hands, primarily radial 3 digits.  This is worse overnight and with elevated hand activities.    Past Medical History:   Diagnosis Date    Personal history of other specified conditions 03/02/2018    History of urinary incontinence       Medication Documentation Review Audit       Reviewed by Xuan Reid CMA (Medical Assistant) on 08/29/24 at 1042      Medication Order Taking? Sig Documenting Provider Last Dose Status   carvedilol (Coreg) 3.125 mg tablet [id number] No Take by mouth 2 times a day with meals. Historical MD Roro Taking Active   chlorhexidine (Peridex) 0.12 % solution [id number] No Take by mouth. Historical MD Roro Not Taking Active   citalopram (CeleXA) 20 mg tablet [id number] No 2 tablets (40 mg) once daily. Historical MD Roro Taking Active   citalopram (CeleXA) 40 mg tablet [id number] No Take 1 tablet (40 mg) by mouth once daily. Saud Read MD Not Taking Active   fexofenadine (Allegra) 180 mg tablet [id number] No Take 1 tablet (180 mg) by mouth once daily in the morning. Take before meals. Saud Read MD Not Taking Active   lisinopril 5 mg tablet [id number] No Take 1 tablet (5 mg) by mouth once daily. Historical Provider, MD Taking Active   LORazepam (Ativan) 1 mg tablet [id number] No TAKE 1 TO 2 TABLETS EVERY 12  HOURS AS NEEDED. Historical Provider, MD Not Taking Active   omeprazole (PriLOSEC) 20 mg DR capsule [id number] No Take 1 capsule (20 mg) by mouth once every 24 hours. Historical Provider, MD Taking Active   oxyCODONE (Roxicodone) 5 mg immediate release tablet [id number] No TAKE 1 TABLET EVERY 4 HOURS AS NEEDED. Saud Read MD Not Taking Active   pantoprazole (ProtoNix) 40 mg EC tablet [id number] No Take 1 tablet (40 mg) by mouth once daily. Historical Provider, MD Not Taking Active   prednisoLONE acetate (Pred Forte) 1 % ophthalmic suspension [id number] No INSTILL 1 DROP INTO RIGHT EYE 4 TIMES DAILY. Danay Baumann MD Taking Active   predniSONE (Deltasone) 5 mg tablet [id number] No TAKE 4 TABLETS DAILY. Historical Provider, MD Not Taking Active   pregabalin (Lyrica) 100 mg capsule [id number] No  Saud Read MD Not Taking Active   rosuvastatin (Crestor) 10 mg tablet [id number] No Take 1 tablet (10 mg) by mouth once daily. Historical Provider, MD Taking Active   sulfamethoxazole-trimethoprim (Bactrim DS) 800-160 mg tablet [id number] No Take 1 tablet by mouth 2 times a day. Historical MD Roro Not Taking Active   tadalafil (Cialis) 20 mg tablet [id number] No Take 1 tablet (20 mg) by mouth once daily as needed for erectile dysfunction. Flor Mabry MD Taking Active   tamsulosin (Flomax) 0.4 mg 24 hr capsule [id number] No Take 1 capsule (0.4 mg) by mouth once daily. Flor Mabry MD Taking Active   temazepam (Restoril) 30 mg capsule [id number] No 1 capsule at bedtime as needed Orally Once a day for 90 days Historical MD Roro Not Taking Active   timolol (Timoptic) 0.5 % ophthalmic solution [id number] No Administer 1 drop into the right eye 2 times a day. Historical Provider, MD Taking Active   traMADol (Ultram) 50 mg tablet [id number] No 2 tablet as needed Orally twice daily for 90 day(s) Saud Read MD Not Taking Active   valACYclovir (Valtrex) 1 gram tablet [id number] No Take 1 tablet (1,000 mg)  by mouth 3 times a day. Historical Provider, MD Not Taking Active   voriconazole (Vfend) 200 mg tablet [id number] No Take 1 tablet (200 mg) by mouth every 12 hours. Historical Provider, MD Not Taking Active   warfarin (Coumadin) 10 mg tablet [id number] No Take 2 tablets (20 mg) by mouth once daily. Historical Provider, MD Taking Differently Active   warfarin (Coumadin) 7.5 mg tablet [id number] No 1 tab po daily on Tuesdays and Wednesdays Historical Provider, MD Not Taking Active                    Allergies   Allergen Reactions    Duloxetine Hcl Nausea/vomiting    Heparin Rash    Heparin Combination Rash       Social History     Socioeconomic History    Marital status:      Spouse name: Not on file    Number of children: Not on file    Years of education: Not on file    Highest education level: Not on file   Occupational History    Not on file   Tobacco Use    Smoking status: Never     Passive exposure: Never    Smokeless tobacco: Never   Substance and Sexual Activity    Alcohol use: Never    Drug use: Never    Sexual activity: Not on file   Other Topics Concern    Not on file   Social History Narrative    Not on file     Social Determinants of Health     Financial Resource Strain: Not on file   Food Insecurity: Not on file   Transportation Needs: Not on file   Physical Activity: Not on file   Stress: Not on file   Social Connections: Not on file   Intimate Partner Violence: Not on file   Housing Stability: Not on file       Past Surgical History:   Procedure Laterality Date    ANTERIOR CRUCIATE LIGAMENT REPAIR  09/21/2017    Primary Repair Of Knee Ligament Cruciate Anterior    CHOLECYSTECTOMY  09/21/2017    Cholecystectomy    CT ANGIO NECK  8/7/2023    CT NECK ANGIO W AND WO IV CONTRAST [facility]    MR HEAD ANGIO WO IV CONTRAST  12/31/2015    MR HEAD ANGIO WO IV CONTRAST [facility]    MR HEAD ANGIO WO IV CONTRAST  12/31/2015    MR HEAD ANGIO WO IV CONTRAST [facility]     SPLENECTOMY, TOTAL  09/21/2017    Splenectomy        Review of Systems   GENERAL: Negative for malaise, significant weight loss, fever  MUSCULOSKELETAL: see HPI  NEURO: See HPI     Physical Examination  Constitutional: Appears well-developed and well-nourished.  Head: Normocephalic and atraumatic.  Eyes: EOMI grossly  Cardiovascular: Intact distal pulses.   Respiratory: Effort normal. No respiratory distress.  Neurologic: Alert and oriented to person, place, and time.  Skin: Skin is warm and dry.  Hematologic / Lymphatic: No lymphedema, lymphangitis.  Psychiatric: normal mood and affect. Behavior is normal.   Musculoskeletal:  Bilateral hands: Skin intact.  Normal rugal patterns.  No thenar or intrinsic atrophy.  5/5 thumb abduction/finger abduction.  Tenderness about right middle, ring and small finger A1 pulley regions with palpable crepitus/catching.  Positive Tinel's at level of carpal tunnels.  Positive Durkan's.  No tenderness about ulnar nerve at level of elbows.    Assessment:  Patient with bilateral carpal tunnel syndrome as well as right middle, ring and small finger trigger fingers     Plan:  Nature of the diagnoses were reviewed with the patient.  Patient has failed conservative treatment and to recommend surgical intervention for his carpal tunnel syndrome.  Patient does not feel ready to proceed with surgical intervention and prefers to continue with nonoperative modalities.  We did discuss potential for further worsening and permanent nerve injury.  He would also like to repeat corticosteroid injections for his trigger fingers at this time.  We also discussed risks associated with repetitive injections and patient voiced his understanding.    Patient ID: Cooper Tam is a 59 y.o. male.    Hand / UE Inj/Asp: R long A1 for trigger finger on 8/29/2024 8:07 PM  Indications: pain (triggering)  Details: 25 G needle, volar approach  Medications: 1 mL lidocaine 10 mg/mL (1 %); 40 mg triamcinolone acetonide  40 mg/mL  Outcome: tolerated well, no immediate complications  Procedure, treatment alternatives, risks and benefits explained, specific risks discussed. Consent was given by the patient. Immediately prior to procedure a time out was called to verify the correct patient, procedure, equipment, support staff and site/side marked as required.       Hand / UE Inj/Asp: R ring A1 for trigger finger on 8/29/2024 8:07 PM  Indications: pain (triggering)  Details: 25 G needle, volar approach  Medications: 1 mL lidocaine 10 mg/mL (1 %); 40 mg triamcinolone acetonide 40 mg/mL  Outcome: tolerated well, no immediate complications  Procedure, treatment alternatives, risks and benefits explained, specific risks discussed. Consent was given by the patient. Immediately prior to procedure a time out was called to verify the correct patient, procedure, equipment, support staff and site/side marked as required.       Hand / UE Inj/Asp: R small A1 for trigger finger on 8/29/2024 8:07 PM  Indications: pain (triggering)  Details: 25 G needle, volar approach  Medications: 1 mL lidocaine 10 mg/mL (1 %); 40 mg triamcinolone acetonide 40 mg/mL  Outcome: tolerated well, no immediate complications  Procedure, treatment alternatives, risks and benefits explained, specific risks discussed. Consent was given by the patient. Immediately prior to procedure a time out was called to verify the correct patient, procedure, equipment, support staff and site/side marked as required.         Patient tolerated injections well I will follow-up in the future as needed.

## 2024-09-01 LAB
PSA FREE MFR SERPL: 10 %
PSA FREE SERPL-MCNC: 0.1 NG/ML
PSA SERPL IA-MCNC: 1 NG/ML (ref 0–4)

## 2024-09-19 ENCOUNTER — APPOINTMENT (OUTPATIENT)
Dept: HEMATOLOGY/ONCOLOGY | Facility: HOSPITAL | Age: 59
End: 2024-09-19
Payer: MEDICARE

## 2024-10-11 ENCOUNTER — OFFICE VISIT (OUTPATIENT)
Dept: HEMATOLOGY/ONCOLOGY | Facility: CLINIC | Age: 59
End: 2024-10-11
Payer: MEDICARE

## 2024-10-11 ENCOUNTER — LAB (OUTPATIENT)
Dept: LAB | Facility: CLINIC | Age: 59
End: 2024-10-11
Payer: MEDICARE

## 2024-10-11 VITALS
OXYGEN SATURATION: 98 % | BODY MASS INDEX: 23.17 KG/M2 | SYSTOLIC BLOOD PRESSURE: 121 MMHG | DIASTOLIC BLOOD PRESSURE: 80 MMHG | HEART RATE: 59 BPM | WEIGHT: 139.22 LBS | RESPIRATION RATE: 16 BRPM | TEMPERATURE: 96.4 F

## 2024-10-11 DIAGNOSIS — D69.41 EVAN'S SYNDROME (MULTI): Primary | ICD-10-CM

## 2024-10-11 DIAGNOSIS — D69.41 EVAN'S SYNDROME (MULTI): ICD-10-CM

## 2024-10-11 LAB
ACANTHOCYTES BLD QL SMEAR: ABNORMAL
B2 GLYCOPROT1 IGA SER-ACNC: <0.6 U/ML
B2 GLYCOPROT1 IGG SER-ACNC: <1.4 U/ML
B2 GLYCOPROT1 IGM SER-ACNC: 0.3 U/ML
BASOPHILS # BLD MANUAL: 0.2 X10*3/UL (ref 0–0.1)
BASOPHILS NFR BLD MANUAL: 2 %
CARDIOLIPIN IGA SERPL-ACNC: <0.5 APL U/ML
CARDIOLIPIN IGG SER IA-ACNC: <1.6 GPL U/ML
CARDIOLIPIN IGM SER IA-ACNC: 0.2 MPL U/ML
DACRYOCYTES BLD QL SMEAR: ABNORMAL
DAT-COMPLEMENT: NORMAL
DRVVT SCREEN TO CONFIRM RATIO: 1.81 RATIO
DRVVT/DRVVT CFM NRMLZD PPP-RTO: 1.37 RATIO
DRVVT/DRVVT CFM P DOAC NEUT NORM PPP-RTO: 1.32 RATIO
EOSINOPHIL # BLD MANUAL: 0.3 X10*3/UL (ref 0–0.7)
EOSINOPHIL NFR BLD MANUAL: 3 %
ERYTHROCYTE [DISTWIDTH] IN BLOOD BY AUTOMATED COUNT: 15.1 % (ref 11.5–14.5)
GIANT PLATELETS BLD QL SMEAR: ABNORMAL
HAPTOGLOB SERPL NEPH-MCNC: 133 MG/DL (ref 30–200)
HCT VFR BLD AUTO: 41.2 % (ref 41–52)
HGB BLD-MCNC: 14.1 G/DL (ref 13.5–17.5)
HGB RETIC QN: 35 PG (ref 28–38)
IMM GRANULOCYTES # BLD AUTO: 0.02 X10*3/UL (ref 0–0.7)
IMM GRANULOCYTES NFR BLD AUTO: 0.2 % (ref 0–0.9)
IMMATURE RETIC FRACTION: 7.3 %
LA 2 SCREEN W REFLEX-IMP: ABNORMAL
LDH SERPL L TO P-CCNC: 117 U/L (ref 84–246)
LYMPHOCYTES # BLD MANUAL: 2.57 X10*3/UL (ref 1.2–4.8)
LYMPHOCYTES NFR BLD MANUAL: 26 %
MCH RBC QN AUTO: 34 PG (ref 26–34)
MCHC RBC AUTO-ENTMCNC: 34.2 G/DL (ref 32–36)
MCV RBC AUTO: 99 FL (ref 80–100)
MONOCYTES # BLD MANUAL: 0.59 X10*3/UL (ref 0.1–1)
MONOCYTES NFR BLD MANUAL: 6 %
NEUTROPHILS # BLD MANUAL: 6.24 X10*3/UL (ref 1.2–7.7)
NEUTS BAND # BLD MANUAL: 0.1 X10*3/UL (ref 0–0.7)
NEUTS BAND NFR BLD MANUAL: 1 %
NEUTS SEG # BLD MANUAL: 6.14 X10*3/UL (ref 1.2–7)
NEUTS SEG NFR BLD MANUAL: 62 %
NORMALIZED SCT PPP-RTO: 0.73 RATIO
NRBC BLD-RTO: 0 /100 WBCS (ref 0–0)
PLATELET # BLD AUTO: 303 X10*3/UL (ref 150–450)
POLYCHROMASIA BLD QL SMEAR: ABNORMAL
RBC # BLD AUTO: 4.15 X10*6/UL (ref 4.5–5.9)
RBC MORPH BLD: ABNORMAL
RETICS #: 0.06 X10*6/UL (ref 0.02–0.12)
RETICS/RBC NFR AUTO: 1.5 % (ref 0.5–2)
SILICA CLOTTING TIME CONFIRMATION: 1.32 RATIO
SILICA CLOTTING TIME SCREEN: 0.96 RATIO
TARGETS BLD QL SMEAR: ABNORMAL
TOTAL CELLS COUNTED BLD: 100
WBC # BLD AUTO: 9.9 X10*3/UL (ref 4.4–11.3)

## 2024-10-11 PROCEDURE — 36415 COLL VENOUS BLD VENIPUNCTURE: CPT

## 2024-10-11 PROCEDURE — 86880 COOMBS TEST DIRECT: CPT

## 2024-10-11 PROCEDURE — 83010 ASSAY OF HAPTOGLOBIN QUANT: CPT

## 2024-10-11 PROCEDURE — 86146 BETA-2 GLYCOPROTEIN ANTIBODY: CPT

## 2024-10-11 PROCEDURE — 83615 LACTATE (LD) (LDH) ENZYME: CPT

## 2024-10-11 PROCEDURE — 85045 AUTOMATED RETICULOCYTE COUNT: CPT

## 2024-10-11 PROCEDURE — 85613 RUSSELL VIPER VENOM DILUTED: CPT

## 2024-10-11 PROCEDURE — 85007 BL SMEAR W/DIFF WBC COUNT: CPT

## 2024-10-11 PROCEDURE — 99215 OFFICE O/P EST HI 40 MIN: CPT | Performed by: NURSE PRACTITIONER

## 2024-10-11 PROCEDURE — 85027 COMPLETE CBC AUTOMATED: CPT

## 2024-10-11 PROCEDURE — 86147 CARDIOLIPIN ANTIBODY EA IG: CPT

## 2024-10-11 RX ORDER — HEPARIN 100 UNIT/ML
500 SYRINGE INTRAVENOUS AS NEEDED
OUTPATIENT
Start: 2024-12-13

## 2024-10-11 RX ORDER — ALBUTEROL SULFATE 0.83 MG/ML
3 SOLUTION RESPIRATORY (INHALATION) AS NEEDED
OUTPATIENT
Start: 2024-12-13

## 2024-10-11 RX ORDER — FERROUS SULFATE 325(65) MG
325 TABLET ORAL
COMMUNITY

## 2024-10-11 RX ORDER — DIPHENHYDRAMINE HYDROCHLORIDE 50 MG/ML
50 INJECTION INTRAMUSCULAR; INTRAVENOUS AS NEEDED
OUTPATIENT
Start: 2024-12-13

## 2024-10-11 RX ORDER — FAMOTIDINE 10 MG/ML
20 INJECTION INTRAVENOUS ONCE AS NEEDED
OUTPATIENT
Start: 2024-12-13

## 2024-10-11 RX ORDER — PROCHLORPERAZINE EDISYLATE 5 MG/ML
10 INJECTION INTRAMUSCULAR; INTRAVENOUS EVERY 6 HOURS PRN
OUTPATIENT
Start: 2024-12-13

## 2024-10-11 RX ORDER — HEPARIN SODIUM,PORCINE/PF 10 UNIT/ML
50 SYRINGE (ML) INTRAVENOUS AS NEEDED
OUTPATIENT
Start: 2024-12-13

## 2024-10-11 RX ORDER — DIPHENHYDRAMINE HCL 25 MG
50 CAPSULE ORAL ONCE
OUTPATIENT
Start: 2024-12-13

## 2024-10-11 RX ORDER — PROCHLORPERAZINE MALEATE 10 MG
10 TABLET ORAL EVERY 6 HOURS PRN
OUTPATIENT
Start: 2024-12-13

## 2024-10-11 RX ORDER — EPINEPHRINE 0.3 MG/.3ML
0.3 INJECTION SUBCUTANEOUS EVERY 5 MIN PRN
OUTPATIENT
Start: 2024-12-13

## 2024-10-11 RX ORDER — ACETAMINOPHEN 325 MG/1
650 TABLET ORAL ONCE
OUTPATIENT
Start: 2024-12-13

## 2024-10-11 ASSESSMENT — PAIN SCALES - GENERAL: PAINLEVEL: 0-NO PAIN

## 2024-10-11 ASSESSMENT — ENCOUNTER SYMPTOMS
NEUROLOGICAL NEGATIVE: 1
HEMATOLOGIC/LYMPHATIC NEGATIVE: 1
FEVER: 0
CARDIOVASCULAR NEGATIVE: 1
GASTROINTESTINAL NEGATIVE: 1
RESPIRATORY NEGATIVE: 1
FATIGUE: 0
MUSCULOSKELETAL NEGATIVE: 1
EYES NEGATIVE: 1
DIAPHORESIS: 0
UNEXPECTED WEIGHT CHANGE: 0
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
## 2024-10-11 NOTE — PROGRESS NOTES
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      Labs completed this visit for review.    No concerns or complaints noted at this time.   Patient here alone    Next infusion in December    Medications and Allergies reviewed and reconciled this visit.    Follow up per MD request.    Patient reports availability and use of mychart, Reviewed this is a good place to communicate with the team as well as review labs and upcoming orders.      No barriers to education noted, patient agrees to current plan and verbalized understanding using teach back method.

## 2024-10-15 DIAGNOSIS — D59.12 COLD AGGLUTININ DISEASE (MULTI): Primary | ICD-10-CM

## 2024-10-18 LAB — COLD AGGLUTININ TITER: NORMAL

## 2024-11-14 ENCOUNTER — ANCILLARY PROCEDURE (OUTPATIENT)
Dept: URGENT CARE | Age: 59
End: 2024-11-14
Payer: MEDICARE

## 2024-11-14 ENCOUNTER — OFFICE VISIT (OUTPATIENT)
Dept: URGENT CARE | Age: 59
End: 2024-11-14
Payer: MEDICARE

## 2024-11-14 VITALS
HEART RATE: 70 BPM | RESPIRATION RATE: 16 BRPM | DIASTOLIC BLOOD PRESSURE: 83 MMHG | BODY MASS INDEX: 23.32 KG/M2 | SYSTOLIC BLOOD PRESSURE: 134 MMHG | OXYGEN SATURATION: 98 % | WEIGHT: 140 LBS | TEMPERATURE: 98.2 F | HEIGHT: 65 IN

## 2024-11-14 DIAGNOSIS — D69.41 EVAN'S SYNDROME (MULTI): Primary | ICD-10-CM

## 2024-11-14 DIAGNOSIS — R05.1 ACUTE COUGH: ICD-10-CM

## 2024-11-14 DIAGNOSIS — B96.89 ACUTE BACTERIAL BRONCHITIS: Primary | ICD-10-CM

## 2024-11-14 DIAGNOSIS — J20.8 ACUTE BACTERIAL BRONCHITIS: Primary | ICD-10-CM

## 2024-11-14 PROCEDURE — 3008F BODY MASS INDEX DOCD: CPT | Performed by: SURGERY

## 2024-11-14 PROCEDURE — 71046 X-RAY EXAM CHEST 2 VIEWS: CPT | Performed by: SURGERY

## 2024-11-14 PROCEDURE — 99203 OFFICE O/P NEW LOW 30 MIN: CPT | Performed by: SURGERY

## 2024-11-14 PROCEDURE — 1036F TOBACCO NON-USER: CPT | Performed by: SURGERY

## 2024-11-14 RX ORDER — DOXYCYCLINE 100 MG/1
100 CAPSULE ORAL 2 TIMES DAILY
Qty: 14 CAPSULE | Refills: 0 | Status: SHIPPED | OUTPATIENT
Start: 2024-11-14 | End: 2024-11-21

## 2024-11-14 RX ORDER — AMOXICILLIN AND CLAVULANATE POTASSIUM 875; 125 MG/1; MG/1
1 TABLET, FILM COATED ORAL 2 TIMES DAILY
Qty: 20 TABLET | Refills: 0 | Status: SHIPPED | OUTPATIENT
Start: 2024-11-14 | End: 2024-11-24

## 2024-11-14 ASSESSMENT — PAIN SCALES - GENERAL: PAINLEVEL_OUTOF10: 8

## 2024-11-14 NOTE — PROGRESS NOTES
Chief Complaint   Patient presents with    Cough     2 weeks    Back Pain     Behind right shoulder blade for 2 weeks       Physical Exam:     GEN: No acute distress    ENT: Bilateral TMs and canals unremarkable, sinus congestion present. Pharynx and tonsils mildly hyperemic but without exudate.     Resp: Lungs clear to auscultation bilaterally     Imaging:       === 11/14/24 ===    XR CHEST 2 VIEWS    - Impression -  No acute cardiopulmonary process.      Signed by: Zain Alaniz 11/14/2024 10:18 AM  Dictation workstation:   [id number]    Assessment:     Encounter Diagnosis   Name Primary?    Acute bacterial bronchitis Yes          Medical Decision Making & Plan:     Augmentin   Azithromycin  Prednisone        11/14/24 at 10:51 AM - Arcelia Melton, DO

## 2024-12-11 ENCOUNTER — TELEPHONE (OUTPATIENT)
Dept: HEMATOLOGY/ONCOLOGY | Facility: CLINIC | Age: 59
End: 2024-12-11
Payer: MEDICARE

## 2024-12-11 NOTE — TELEPHONE ENCOUNTER
Genny Johnson note 10-11-24.  Plan:  - given excellent response with rituximab maintenance every 6 months, will can decrease frequency to yearly starting '24. Pt has been receiving 375 mg/m2 x1 every 6 months in the past, last dose 12/2023.  - does not appear to have an increased frequency of infectio    Cooper does need the infusion 12-13 -24.  He was notified and will be here.

## 2024-12-13 ENCOUNTER — INFUSION (OUTPATIENT)
Dept: HEMATOLOGY/ONCOLOGY | Facility: CLINIC | Age: 59
End: 2024-12-13
Payer: MEDICARE

## 2024-12-13 VITALS
DIASTOLIC BLOOD PRESSURE: 80 MMHG | SYSTOLIC BLOOD PRESSURE: 135 MMHG | HEART RATE: 54 BPM | TEMPERATURE: 96.8 F | OXYGEN SATURATION: 98 % | RESPIRATION RATE: 17 BRPM | WEIGHT: 141.43 LBS | BODY MASS INDEX: 23.53 KG/M2

## 2024-12-13 DIAGNOSIS — D69.41 EVAN'S SYNDROME (MULTI): ICD-10-CM

## 2024-12-13 LAB
ALBUMIN SERPL BCP-MCNC: 4.5 G/DL (ref 3.4–5)
ALP SERPL-CCNC: 85 U/L (ref 33–136)
ALT SERPL W P-5'-P-CCNC: 12 U/L (ref 10–52)
ANION GAP SERPL CALC-SCNC: 11 MMOL/L (ref 10–20)
AST SERPL W P-5'-P-CCNC: 14 U/L (ref 9–39)
BASOPHILS # BLD AUTO: 0.13 X10*3/UL (ref 0–0.1)
BASOPHILS NFR BLD AUTO: 1.5 %
BILIRUB DIRECT SERPL-MCNC: 0.1 MG/DL (ref 0–0.3)
BILIRUB SERPL-MCNC: 0.5 MG/DL (ref 0–1.2)
BUN SERPL-MCNC: 13 MG/DL (ref 6–23)
CALCIUM SERPL-MCNC: 9.3 MG/DL (ref 8.6–10.3)
CHLORIDE SERPL-SCNC: 103 MMOL/L (ref 98–107)
CO2 SERPL-SCNC: 30 MMOL/L (ref 21–32)
CREAT SERPL-MCNC: 1.09 MG/DL (ref 0.5–1.3)
DAT-COMPLEMENT: NORMAL
EGFRCR SERPLBLD CKD-EPI 2021: 77 ML/MIN/1.73M*2
EOSINOPHIL # BLD AUTO: 0.49 X10*3/UL (ref 0–0.7)
EOSINOPHIL NFR BLD AUTO: 5.7 %
ERYTHROCYTE [DISTWIDTH] IN BLOOD BY AUTOMATED COUNT: 13.9 % (ref 11.5–14.5)
GLUCOSE SERPL-MCNC: 119 MG/DL (ref 74–99)
HAPTOGLOB SERPL NEPH-MCNC: 132 MG/DL (ref 30–200)
HCT VFR BLD AUTO: 45.1 % (ref 41–52)
HGB BLD-MCNC: 15.5 G/DL (ref 13.5–17.5)
HGB RETIC QN: 35 PG (ref 28–38)
IMM GRANULOCYTES # BLD AUTO: 0.02 X10*3/UL (ref 0–0.7)
IMM GRANULOCYTES NFR BLD AUTO: 0.2 % (ref 0–0.9)
IMMATURE RETIC FRACTION: 3.3 %
LDH SERPL L TO P-CCNC: 119 U/L (ref 84–246)
LYMPHOCYTES # BLD AUTO: 3.25 X10*3/UL (ref 1.2–4.8)
LYMPHOCYTES NFR BLD AUTO: 37.7 %
MCH RBC QN AUTO: 33.8 PG (ref 26–34)
MCHC RBC AUTO-ENTMCNC: 34.4 G/DL (ref 32–36)
MCV RBC AUTO: 99 FL (ref 80–100)
MONOCYTES # BLD AUTO: 0.62 X10*3/UL (ref 0.1–1)
MONOCYTES NFR BLD AUTO: 7.2 %
NEUTROPHILS # BLD AUTO: 4.11 X10*3/UL (ref 1.2–7.7)
NEUTROPHILS NFR BLD AUTO: 47.7 %
NRBC BLD-RTO: 0 /100 WBCS (ref 0–0)
PLATELET # BLD AUTO: 298 X10*3/UL (ref 150–450)
POTASSIUM SERPL-SCNC: 3.9 MMOL/L (ref 3.5–5.3)
PROT SERPL-MCNC: 7 G/DL (ref 6.4–8.2)
RBC # BLD AUTO: 4.58 X10*6/UL (ref 4.5–5.9)
RETICS #: 0.07 X10*6/UL (ref 0.02–0.12)
RETICS/RBC NFR AUTO: 1.5 % (ref 0.5–2)
SODIUM SERPL-SCNC: 140 MMOL/L (ref 136–145)
WBC # BLD AUTO: 8.6 X10*3/UL (ref 4.4–11.3)

## 2024-12-13 PROCEDURE — 2500000004 HC RX 250 GENERAL PHARMACY W/ HCPCS (ALT 636 FOR OP/ED): Mod: JZ,JG | Performed by: NURSE PRACTITIONER

## 2024-12-13 PROCEDURE — 2500000001 HC RX 250 WO HCPCS SELF ADMINISTERED DRUGS (ALT 637 FOR MEDICARE OP): Performed by: NURSE PRACTITIONER

## 2024-12-13 PROCEDURE — 80053 COMPREHEN METABOLIC PANEL: CPT

## 2024-12-13 PROCEDURE — 86880 COOMBS TEST DIRECT: CPT

## 2024-12-13 PROCEDURE — 96415 CHEMO IV INFUSION ADDL HR: CPT

## 2024-12-13 PROCEDURE — 83615 LACTATE (LD) (LDH) ENZYME: CPT

## 2024-12-13 PROCEDURE — 82248 BILIRUBIN DIRECT: CPT

## 2024-12-13 PROCEDURE — 86157 COLD AGGLUTININ TITER: CPT

## 2024-12-13 PROCEDURE — 85025 COMPLETE CBC W/AUTO DIFF WBC: CPT

## 2024-12-13 PROCEDURE — 85045 AUTOMATED RETICULOCYTE COUNT: CPT

## 2024-12-13 PROCEDURE — 83010 ASSAY OF HAPTOGLOBIN QUANT: CPT

## 2024-12-13 PROCEDURE — 96413 CHEMO IV INFUSION 1 HR: CPT

## 2024-12-13 RX ORDER — DIPHENHYDRAMINE HYDROCHLORIDE 50 MG/ML
50 INJECTION INTRAMUSCULAR; INTRAVENOUS AS NEEDED
Status: DISCONTINUED | OUTPATIENT
Start: 2024-12-13 | End: 2024-12-13 | Stop reason: HOSPADM

## 2024-12-13 RX ORDER — ALBUTEROL SULFATE 0.83 MG/ML
3 SOLUTION RESPIRATORY (INHALATION) AS NEEDED
Status: DISCONTINUED | OUTPATIENT
Start: 2024-12-13 | End: 2024-12-13 | Stop reason: HOSPADM

## 2024-12-13 RX ORDER — PROCHLORPERAZINE MALEATE 10 MG
10 TABLET ORAL EVERY 6 HOURS PRN
Status: DISCONTINUED | OUTPATIENT
Start: 2024-12-13 | End: 2024-12-13 | Stop reason: HOSPADM

## 2024-12-13 RX ORDER — DIPHENHYDRAMINE HCL 25 MG
50 CAPSULE ORAL ONCE
Status: COMPLETED | OUTPATIENT
Start: 2024-12-13 | End: 2024-12-13

## 2024-12-13 RX ORDER — EPINEPHRINE 0.3 MG/.3ML
0.3 INJECTION SUBCUTANEOUS EVERY 5 MIN PRN
Status: DISCONTINUED | OUTPATIENT
Start: 2024-12-13 | End: 2024-12-13 | Stop reason: HOSPADM

## 2024-12-13 RX ORDER — FAMOTIDINE 10 MG/ML
20 INJECTION INTRAVENOUS ONCE AS NEEDED
Status: DISCONTINUED | OUTPATIENT
Start: 2024-12-13 | End: 2024-12-13 | Stop reason: HOSPADM

## 2024-12-13 RX ORDER — ACETAMINOPHEN 325 MG/1
650 TABLET ORAL ONCE
Status: COMPLETED | OUTPATIENT
Start: 2024-12-13 | End: 2024-12-13

## 2024-12-13 RX ORDER — PROCHLORPERAZINE EDISYLATE 5 MG/ML
10 INJECTION INTRAMUSCULAR; INTRAVENOUS EVERY 6 HOURS PRN
Status: DISCONTINUED | OUTPATIENT
Start: 2024-12-13 | End: 2024-12-13 | Stop reason: HOSPADM

## 2024-12-13 ASSESSMENT — PAIN SCALES - GENERAL
PAINLEVEL_OUTOF10: 0-NO PAIN

## 2024-12-13 NOTE — PROGRESS NOTES
Rituximab rates verified by 2 RN\  54.5ml/hr for 27 ml  109 ml/hr for 55  163ml/hr for 82  218ml/hr for remainder (163)

## 2024-12-13 NOTE — PROGRESS NOTES
Patient here for rituximab infusion. Obtained IV access, darlene blood and sent to lab. Tolerated without complication.

## 2024-12-15 LAB — COLD AGGLUTININ TITER: NORMAL

## 2025-03-28 ENCOUNTER — TELEPHONE (OUTPATIENT)
Dept: HEMATOLOGY/ONCOLOGY | Facility: CLINIC | Age: 60
End: 2025-03-28
Payer: MEDICARE

## 2025-05-23 ENCOUNTER — APPOINTMENT (OUTPATIENT)
Dept: HEMATOLOGY/ONCOLOGY | Facility: CLINIC | Age: 60
End: 2025-05-23
Payer: MEDICARE

## 2025-05-30 ENCOUNTER — APPOINTMENT (OUTPATIENT)
Dept: HEMATOLOGY/ONCOLOGY | Facility: CLINIC | Age: 60
End: 2025-05-30
Payer: MEDICARE

## 2025-05-30 DIAGNOSIS — D69.41 EVAN'S SYNDROME (MULTI): Primary | ICD-10-CM

## 2025-06-09 ENCOUNTER — OFFICE VISIT (OUTPATIENT)
Dept: HEMATOLOGY/ONCOLOGY | Facility: CLINIC | Age: 60
End: 2025-06-09
Payer: MEDICARE

## 2025-06-09 VITALS
HEART RATE: 64 BPM | BODY MASS INDEX: 24.38 KG/M2 | RESPIRATION RATE: 18 BRPM | OXYGEN SATURATION: 98 % | WEIGHT: 146.5 LBS | TEMPERATURE: 97.5 F | SYSTOLIC BLOOD PRESSURE: 130 MMHG | DIASTOLIC BLOOD PRESSURE: 80 MMHG

## 2025-06-09 DIAGNOSIS — D69.41 EVAN'S SYNDROME (MULTI): ICD-10-CM

## 2025-06-09 LAB
ACANTHOCYTES BLD QL SMEAR: ABNORMAL
ALBUMIN SERPL BCP-MCNC: 4.3 G/DL (ref 3.4–5)
ALP SERPL-CCNC: 79 U/L (ref 33–136)
ALT SERPL W P-5'-P-CCNC: 12 U/L (ref 10–52)
ANION GAP SERPL CALC-SCNC: 11 MMOL/L (ref 10–20)
AST SERPL W P-5'-P-CCNC: 13 U/L (ref 9–39)
BASO STIPL BLD QL SMEAR: PRESENT
BASOPHILS # BLD MANUAL: 0.3 X10*3/UL (ref 0–0.1)
BASOPHILS NFR BLD MANUAL: 4 %
BILIRUB SERPL-MCNC: 0.6 MG/DL (ref 0–1.2)
BUN SERPL-MCNC: 11 MG/DL (ref 6–23)
BURR CELLS BLD QL SMEAR: ABNORMAL
CALCIUM SERPL-MCNC: 8.9 MG/DL (ref 8.6–10.3)
CHLORIDE SERPL-SCNC: 107 MMOL/L (ref 98–107)
CO2 SERPL-SCNC: 26 MMOL/L (ref 21–32)
CREAT SERPL-MCNC: 0.94 MG/DL (ref 0.5–1.3)
DAT-COMPLEMENT: NORMAL
EGFRCR SERPLBLD CKD-EPI 2021: >90 ML/MIN/1.73M*2
EOSINOPHIL # BLD MANUAL: 0.23 X10*3/UL (ref 0–0.7)
EOSINOPHIL NFR BLD MANUAL: 3 %
ERYTHROCYTE [DISTWIDTH] IN BLOOD BY AUTOMATED COUNT: 14.5 % (ref 11.5–14.5)
GLUCOSE SERPL-MCNC: 115 MG/DL (ref 74–99)
HAPTOGLOB SERPL NEPH-MCNC: 107 MG/DL (ref 30–200)
HCT VFR BLD AUTO: 40.2 % (ref 41–52)
HGB BLD-MCNC: 14.3 G/DL (ref 13.5–17.5)
HOWELL-JOLLY BOD BLD QL SMEAR: PRESENT
IGG SERPL-MCNC: 685 MG/DL (ref 700–1600)
IMM GRANULOCYTES # BLD AUTO: 0.01 X10*3/UL (ref 0–0.7)
IMM GRANULOCYTES NFR BLD AUTO: 0.1 % (ref 0–0.9)
LDH SERPL L TO P-CCNC: 122 U/L (ref 84–246)
LYMPHOCYTES # BLD MANUAL: 2.4 X10*3/UL (ref 1.2–4.8)
LYMPHOCYTES NFR BLD MANUAL: 32 %
MCH RBC QN AUTO: 34.3 PG (ref 26–34)
MCHC RBC AUTO-ENTMCNC: 35.6 G/DL (ref 32–36)
MCV RBC AUTO: 96 FL (ref 80–100)
MONOCYTES # BLD MANUAL: 0.6 X10*3/UL (ref 0.1–1)
MONOCYTES NFR BLD MANUAL: 8 %
NEUTS SEG # BLD MANUAL: 3.98 X10*3/UL (ref 1.2–7)
NEUTS SEG NFR BLD MANUAL: 53 %
NRBC BLD-RTO: 0 /100 WBCS (ref 0–0)
PLATELET # BLD AUTO: 265 X10*3/UL (ref 150–450)
POTASSIUM SERPL-SCNC: 4.1 MMOL/L (ref 3.5–5.3)
PROT SERPL-MCNC: 6.4 G/DL (ref 6.4–8.2)
RBC # BLD AUTO: 4.17 X10*6/UL (ref 4.5–5.9)
RBC MORPH BLD: ABNORMAL
SODIUM SERPL-SCNC: 140 MMOL/L (ref 136–145)
SPHEROCYTES BLD QL SMEAR: ABNORMAL
TOTAL CELLS COUNTED BLD: 100
WBC # BLD AUTO: 7.5 X10*3/UL (ref 4.4–11.3)

## 2025-06-09 PROCEDURE — 83010 ASSAY OF HAPTOGLOBIN QUANT: CPT | Performed by: NURSE PRACTITIONER

## 2025-06-09 PROCEDURE — 88185 FLOWCYTOMETRY/TC ADD-ON: CPT | Mod: TC | Performed by: NURSE PRACTITIONER

## 2025-06-09 PROCEDURE — 99215 OFFICE O/P EST HI 40 MIN: CPT | Performed by: NURSE PRACTITIONER

## 2025-06-09 PROCEDURE — 83615 LACTATE (LD) (LDH) ENZYME: CPT | Performed by: NURSE PRACTITIONER

## 2025-06-09 PROCEDURE — 85027 COMPLETE CBC AUTOMATED: CPT | Performed by: NURSE PRACTITIONER

## 2025-06-09 PROCEDURE — 84075 ASSAY ALKALINE PHOSPHATASE: CPT | Performed by: NURSE PRACTITIONER

## 2025-06-09 PROCEDURE — 85007 BL SMEAR W/DIFF WBC COUNT: CPT | Performed by: NURSE PRACTITIONER

## 2025-06-09 PROCEDURE — 86880 COOMBS TEST DIRECT: CPT

## 2025-06-09 PROCEDURE — 82784 ASSAY IGA/IGD/IGG/IGM EACH: CPT | Performed by: NURSE PRACTITIONER

## 2025-06-09 ASSESSMENT — PAIN SCALES - GENERAL: PAINLEVEL_OUTOF10: 0-NO PAIN
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
## 2025-06-10 ENCOUNTER — DOCUMENTATION (OUTPATIENT)
Dept: HEMATOLOGY/ONCOLOGY | Facility: CLINIC | Age: 60
End: 2025-06-10
Payer: MEDICARE

## 2025-06-10 DIAGNOSIS — D69.41 EVAN'S SYNDROME (MULTI): Primary | ICD-10-CM

## 2025-06-10 RX ORDER — PROCHLORPERAZINE EDISYLATE 5 MG/ML
10 INJECTION INTRAMUSCULAR; INTRAVENOUS EVERY 6 HOURS PRN
OUTPATIENT
Start: 2025-12-16

## 2025-06-10 RX ORDER — DIPHENHYDRAMINE HYDROCHLORIDE 50 MG/ML
50 INJECTION, SOLUTION INTRAMUSCULAR; INTRAVENOUS AS NEEDED
OUTPATIENT
Start: 2025-12-16

## 2025-06-10 RX ORDER — EPINEPHRINE 0.3 MG/.3ML
0.3 INJECTION SUBCUTANEOUS EVERY 5 MIN PRN
OUTPATIENT
Start: 2025-12-16

## 2025-06-10 RX ORDER — ALBUTEROL SULFATE 0.83 MG/ML
3 SOLUTION RESPIRATORY (INHALATION) AS NEEDED
OUTPATIENT
Start: 2025-12-16

## 2025-06-10 RX ORDER — FAMOTIDINE 10 MG/ML
20 INJECTION, SOLUTION INTRAVENOUS ONCE AS NEEDED
OUTPATIENT
Start: 2025-12-16

## 2025-06-10 RX ORDER — PROCHLORPERAZINE MALEATE 10 MG
10 TABLET ORAL EVERY 6 HOURS PRN
OUTPATIENT
Start: 2025-12-16

## 2025-06-10 RX ORDER — DIPHENHYDRAMINE HCL 25 MG
50 CAPSULE ORAL ONCE
OUTPATIENT
Start: 2025-12-16

## 2025-06-10 RX ORDER — ACETAMINOPHEN 325 MG/1
650 TABLET ORAL ONCE
OUTPATIENT
Start: 2025-12-16

## 2025-06-10 NOTE — PROGRESS NOTES
Attempt to call patient, no answer no ability to leave message  Have reviewed case and labs with Dr Berg and she believes we should continue yearly Rituxan due to high relapse rate of 68%  Will try to call again later  Genny Johnson PA-C

## 2025-06-13 LAB
CELL COUNT (BLOOD): 7.5 X10*3/UL
CELL POPULATIONS: NORMAL
DIAGNOSIS: NORMAL
FLOW DIFFERENTIAL: NORMAL
FLOW TEST ORDERED: NORMAL
LAB TEST METHOD: NORMAL
NUMBER OF CELLS COLLECTED: NORMAL PER TUBE
PATH REPORT.TOTAL CANCER: NORMAL
SIGNATURE COMMENT: NORMAL
SPECIMEN VIABILITY: NORMAL

## 2025-06-18 ENCOUNTER — DOCUMENTATION (OUTPATIENT)
Dept: HEMATOLOGY/ONCOLOGY | Facility: CLINIC | Age: 60
End: 2025-06-18
Payer: MEDICARE